# Patient Record
Sex: FEMALE | Race: WHITE | Employment: STUDENT | URBAN - METROPOLITAN AREA
[De-identification: names, ages, dates, MRNs, and addresses within clinical notes are randomized per-mention and may not be internally consistent; named-entity substitution may affect disease eponyms.]

---
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
## 2020-09-10 DIAGNOSIS — Z11.59 SPECIAL SCREENING EXAMINATION FOR VIRAL DISEASE: ICD-10-CM

## 2020-09-10 LAB
COVID-19 ANTIBODY IGG: NEGATIVE
LAB TEST METHOD: NORMAL

## 2020-09-11 LAB
SARS-COV-2 RNA SPEC QL NAA+PROBE: NOT DETECTED
SPECIMEN SOURCE: NORMAL

## 2020-09-12 DIAGNOSIS — Z11.59 SPECIAL SCREENING EXAMINATION FOR VIRAL DISEASE: ICD-10-CM

## 2020-09-12 LAB
SARS-COV-2 RNA SPEC QL NAA+PROBE: NOT DETECTED
SPECIMEN SOURCE: NORMAL

## 2021-01-03 ENCOUNTER — HEALTH MAINTENANCE LETTER (OUTPATIENT)
Age: 21
End: 2021-01-03

## 2021-03-31 ENCOUNTER — OFFICE VISIT (OUTPATIENT)
Dept: ORTHOPEDICS | Facility: CLINIC | Age: 21
End: 2021-03-31
Payer: COMMERCIAL

## 2021-03-31 VITALS
BODY MASS INDEX: 24.49 KG/M2 | HEIGHT: 65 IN | SYSTOLIC BLOOD PRESSURE: 117 MMHG | WEIGHT: 147 LBS | HEART RATE: 52 BPM | DIASTOLIC BLOOD PRESSURE: 67 MMHG

## 2021-03-31 DIAGNOSIS — M76.821 POSTERIOR TIBIAL TENDINITIS OF RIGHT LOWER EXTREMITY: Primary | ICD-10-CM

## 2021-03-31 RX ORDER — DICLOFENAC SODIUM 75 MG/1
75 TABLET, DELAYED RELEASE ORAL DAILY PRN
Qty: 30 TABLET | Refills: 0 | Status: SHIPPED | OUTPATIENT
Start: 2021-03-31 | End: 2022-01-22

## 2021-03-31 ASSESSMENT — MIFFLIN-ST. JEOR: SCORE: 1437.67

## 2021-03-31 NOTE — LETTER
Date:April 1, 2021      Patient was self referred, no letter generated. Do not send.        [facility] Health Information

## 2021-03-31 NOTE — LETTER
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  Abdelrahman first felt pain in her right foot just over a week ago, no single inciting event that she can recall.  She points to the medial aspect of her foot.  This is tender to the touch, no swelling, no numbness or tingling.  She denies hearing or feeling a pop.  She has been trying a rigid insole, Graston Technique, topical treatment, and oral Aleve.        Additional history: as documented    MEDICAL HISTORY  There is no problem list on file for this patient.      Current Outpatient Medications   Medication Sig Dispense Refill     diclofenac (VOLTAREN) 75 MG EC tablet Take 1 tablet (75 mg) by mouth daily as needed for moderate pain 30 tablet 0     norethindrone-ethinyl estradiol-iron (ESTROSTEP FE) 1-20/1-30/1-35 MG-MCG per tablet Take 1 tablet by mouth daily         Allergies   Allergen Reactions     Penicillin G        No family history on file.    Additional medical/Social/Surgical histories reviewed in [facility] and updated as appropriate.        PHYSICAL EXAM  Vitals:    03/31/21 0940   BP: 117/67   Pulse: 52   Weight: 66.7 kg (147 lb)   Height: 1.651 m (5' 5\")     General  - normal appearance, in no obvious distress  HEENT  - conjunctivae not injected, moist mucous membranes  CV  - normal pulses at posterior tib and dorsalis pedis  Pulm  - normal respiratory pattern, non-labored  Musculoskeletal - right foot  - stance: normal gait without limp  - inspection: mildly thickened PTT just inferior to navicular  - palpation: tender PTT at thickened area  - ROM: normal active and passive ROM of great and lesser toes, no pain with MT translation  - strength: 5/5 in all planes  Neuro  - no sensory or motor deficit, grossly normal coordination, normal muscle tone  Skin  - no ecchymosis, erythema, warmth, or induration, no " obvious rash  Psych  - interactive, appropriate, normal mood and affect         ASSESSMENT & PLAN  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    We discussed the treatment from a broad perspective, this does include offloading using a high, midfoot arch support, topical treatments, oral anti-inflammatories, rest, and rehab activities.  I am happy that she has initiated these.  I do think the most important thing for her at the moment is to reduce load on the tendon by continuing to wear her midfoot supports, I do encourage her to wear shoes at home as well, to prevent midfoot collapse and further stretching of the tendon.    I am prescribing her diclofenac for her pain and for inflammation.  She should not take ibuprofen or aleve with this medication.    If her pain is worsening over the coming weeks I would consider imaging.    It was a pleasure seeing Dasha today.    Myles Ventura DO, CAM  Primary Care Sports Medicine      This note was constructed using Dragon dictation software, please excuse any minor errors in spelling, grammar, or syntax.        Myles Ventura DO

## 2021-03-31 NOTE — PROGRESS NOTES
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  Abdelrahman first felt pain in her right foot just over a week ago, no single inciting event that she can recall.  She points to the medial aspect of her foot.  This is tender to the touch, no swelling, no numbness or tingling.  She denies hearing or feeling a pop.  She has been trying a rigid insole, Graston Technique, topical treatment, and oral Aleve.        Additional history: as documented    MEDICAL HISTORY  There is no problem list on file for this patient.      Current Outpatient Medications   Medication Sig Dispense Refill     diclofenac (VOLTAREN) 75 MG EC tablet Take 1 tablet (75 mg) by mouth daily as needed for moderate pain 30 tablet 0     norethindrone-ethinyl estradiol-iron (ESTROSTEP FE) 1-20/1-30/1-35 MG-MCG per tablet Take 1 tablet by mouth daily         Allergies   Allergen Reactions     Penicillin G        No family history on file.    Additional medical/Social/Surgical histories reviewed in [facility] and updated as appropriate.        PHYSICAL EXAM  Vitals:    03/31/21 0940   BP: 117/67   Pulse: 52   Weight: 66.7 kg (147 lb)   Height: 1.651 m (5' 5\")     General  - normal appearance, in no obvious distress  HEENT  - conjunctivae not injected, moist mucous membranes  CV  - normal pulses at posterior tib and dorsalis pedis  Pulm  - normal respiratory pattern, non-labored  Musculoskeletal - right foot  - stance: normal gait without limp  - inspection: mildly thickened PTT just inferior to navicular  - palpation: tender PTT at thickened area  - ROM: normal active and passive ROM of great and lesser toes, no pain with MT translation  - strength: 5/5 in all planes  Neuro  - no sensory or motor deficit, grossly normal coordination, normal muscle tone  Skin  - no ecchymosis, erythema, warmth, or induration, no obvious rash  Psych  - interactive, appropriate, normal mood and affect       "   ASSESSMENT & PLAN  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    We discussed the treatment from a broad perspective, this does include offloading using a high, midfoot arch support, topical treatments, oral anti-inflammatories, rest, and rehab activities.  I am happy that she has initiated these.  I do think the most important thing for her at the moment is to reduce load on the tendon by continuing to wear her midfoot supports, I do encourage her to wear shoes at home as well, to prevent midfoot collapse and further stretching of the tendon.    I am prescribing her diclofenac for her pain and for inflammation.  She should not take ibuprofen or aleve with this medication.    If her pain is worsening over the coming weeks I would consider imaging.    It was a pleasure seeing Dasha today.    Myles Ventura DO, [facility]  Primary Care Sports Medicine      This note was constructed using Dragon dictation software, please excuse any minor errors in spelling, grammar, or syntax.

## 2021-04-19 ENCOUNTER — ANCILLARY PROCEDURE (OUTPATIENT)
Dept: MRI IMAGING | Facility: CLINIC | Age: 21
End: 2021-04-19
Attending: FAMILY MEDICINE
Payer: COMMERCIAL

## 2021-04-19 ENCOUNTER — OFFICE VISIT (OUTPATIENT)
Dept: FAMILY MEDICINE | Facility: CLINIC | Age: 21
End: 2021-04-19
Payer: COMMERCIAL

## 2021-04-19 VITALS
HEIGHT: 65 IN | SYSTOLIC BLOOD PRESSURE: 126 MMHG | DIASTOLIC BLOOD PRESSURE: 75 MMHG | BODY MASS INDEX: 24.32 KG/M2 | HEART RATE: 50 BPM | WEIGHT: 146 LBS

## 2021-04-19 DIAGNOSIS — G89.29 CHRONIC PAIN OF RIGHT ANKLE: Primary | ICD-10-CM

## 2021-04-19 DIAGNOSIS — G89.29 CHRONIC PAIN OF RIGHT ANKLE: ICD-10-CM

## 2021-04-19 DIAGNOSIS — M25.571 CHRONIC PAIN OF RIGHT ANKLE: ICD-10-CM

## 2021-04-19 DIAGNOSIS — M25.571 CHRONIC PAIN OF RIGHT ANKLE: Primary | ICD-10-CM

## 2021-04-19 PROCEDURE — 73721 MRI JNT OF LWR EXTRE W/O DYE: CPT | Mod: RT | Performed by: RADIOLOGY

## 2021-04-19 ASSESSMENT — MIFFLIN-ST. JEOR: SCORE: 1433.13

## 2021-04-19 NOTE — LETTER
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  She states that she has had symptoms since early to mid-March.  She saw Dr. Ventura on 03/31 and arch supports were prescribed, which have helped decrease the pain, but it is still present.  She has been doing pool workouts and then trying to compete in meets.  Any kind of running flares her pain up.  She has been on Diclofenac 1 pill per day for at least 2 weeks.  She has also been in a walking boot some, but she does not like wearing that.  She has not had imaging.      CURRENT MEDICATIONS:     1. Diclofenac 75 mg 1 p.o. every day.     2. Oral contraceptive pills 1 per day.      ALLERGIES:  SHE IS ALLERGIC TO PENICILLIN.      PAST MEDICAL HISTORY:  Unchanged since her last visit.      OBJECTIVE:  Pleasant, in no apparent distress.  Vital signs as listed.  Her right ankle, she shows good range of motion.  She has some mild fullness in the medial aspect of the ankle and inferior and anterior to the medial malleolus.  She is completely nontender on the Achilles tendon over the lateral ankle or the anterior ankle.  She has some tenderness to palpation on the origin of the plantar fascia.  She has negative heel squeeze.  Remainder of her calcaneus is nontender.  The plantar fascia at the origin also feels somewhat tight compared to the opposite side.  She has some minor discomfort with resisted inversion localizing to the area of soreness.  She has more pain with resisted flexor hallucis longus strength testing:  No pain with resisted strength testing of the flexor digitorum.  She is nontender along her anterior tibialis muscle and tendon.  There is no tibiotalar joint line tenderness.  There is no effusion appreciated.  She is completely nontender in the remainder of her foot except  "for some minor tenderness at the insertion of the PT tendon on the navicular.  Her navicular is otherwise nontender.      ASSESSMENT:   1. Probable flexor hallucis longus tendonitis coupled with a posterior tibial tendonitis.   2. Plantar fasciitis.   3. Lower concern for other injuries, although talus stress reaction remains a possibility.      PLAN:  We have discussed some of the anatomy with Abdelrahman and the possibilities of the diagnosis.  I have recommended she undergo an MRI of her ankle with a special focus on this area.  She will get this today or tomorrow and then we will develop a treatment plan.  We briefly discussed using oral medication such as prednisone or also considering a tendon sheath injection with cortisone, although I would like to avoid that if possible.  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  Given that she would have to be out for approximately 4-5 days following the tendon sheath injection with steroid, we will try to sort this out around her competition schedule.  In early May they have 1 weekend when they will meet prior to [employer].  Polina Tinsley, ATC for  track and field, was present for the entire appointment.         /75   Pulse 50   Ht 1.651 m (5' 5\")   Wt 66.2 kg (146 lb)   BMI 24.30 kg/m          Faye Kaiser MD, CAQ, FACSM, CCD  [facility]  Sports Medicine and Bone Health  Team Physician;  Athletics        Faye Kiaser MD    "

## 2021-04-19 NOTE — PROGRESS NOTES
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  She states that she has had symptoms since early to mid-March.  She saw Dr. Ventura on 03/31 and arch supports were prescribed, which have helped decrease the pain, but it is still present.  She has been doing pool workouts and then trying to compete in meets.  Any kind of running flares her pain up.  She has been on Diclofenac 1 pill per day for at least 2 weeks.  She has also been in a walking boot some, but she does not like wearing that.  She has not had imaging.      CURRENT MEDICATIONS:     1. Diclofenac 75 mg 1 p.o. every day.     2. Oral contraceptive pills 1 per day.      ALLERGIES:  SHE IS ALLERGIC TO PENICILLIN.      PAST MEDICAL HISTORY:  Unchanged since her last visit.      OBJECTIVE:  Pleasant, in no apparent distress.  Vital signs as listed.  Her right ankle, she shows good range of motion.  She has some mild fullness in the medial aspect of the ankle and inferior and anterior to the medial malleolus.  She is completely nontender on the Achilles tendon over the lateral ankle or the anterior ankle.  She has some tenderness to palpation on the origin of the plantar fascia.  She has negative heel squeeze.  Remainder of her calcaneus is nontender.  The plantar fascia at the origin also feels somewhat tight compared to the opposite side.  She has some minor discomfort with resisted inversion localizing to the area of soreness.  She has more pain with resisted flexor hallucis longus strength testing:  No pain with resisted strength testing of the flexor digitorum.  She is nontender along her anterior tibialis muscle and tendon.  There is no tibiotalar joint line tenderness.  There is no effusion appreciated.  She is completely nontender in the remainder of her foot except for some minor tenderness at the insertion of the PT tendon on the navicular.  " Her navicular is otherwise nontender.      ASSESSMENT:   1. Probable flexor hallucis longus tendonitis coupled with a posterior tibial tendonitis.   2. Plantar fasciitis.   3. Lower concern for other injuries, although talus stress reaction remains a possibility.      PLAN:  We have discussed some of the anatomy with Abdelrahman and the possibilities of the diagnosis.  I have recommended she undergo an MRI of her ankle with a special focus on this area.  She will get this today or tomorrow and then we will develop a treatment plan.  We briefly discussed using oral medication such as prednisone or also considering a tendon sheath injection with cortisone, although I would like to avoid that if possible.  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  Given that she would have to be out for approximately 4-5 days following the tendon sheath injection with steroid, we will try to sort this out around her competition schedule.  In early May they have 1 weekend when they will meet prior to [employer].  Polina Tinsley, ATC for  track and field, was present for the entire appointment.         /75   Pulse 50   Ht 1.651 m (5' 5\")   Wt 66.2 kg (146 lb)   BMI 24.30 kg/m          Faye Kaiser MD, CAQ, FACSM, CCD  [facility]  Sports Medicine and Bone Health  Team Physician;  Athletics    "

## 2021-04-19 NOTE — LETTER
Date:April 26, 2021      Patient was self referred, no letter generated. Do not send.        [facility] Health Information

## 2021-04-20 ENCOUNTER — OFFICE VISIT (OUTPATIENT)
Dept: FAMILY MEDICINE | Facility: CLINIC | Age: 21
End: 2021-04-20
Payer: COMMERCIAL

## 2021-04-20 VITALS
DIASTOLIC BLOOD PRESSURE: 86 MMHG | HEIGHT: 65 IN | WEIGHT: 146 LBS | BODY MASS INDEX: 24.32 KG/M2 | SYSTOLIC BLOOD PRESSURE: 132 MMHG | HEART RATE: 75 BPM

## 2021-04-20 DIAGNOSIS — M76.821 POSTERIOR TIBIAL TENDINITIS OF RIGHT LEG: Primary | ICD-10-CM

## 2021-04-20 RX ORDER — TRIAMCINOLONE ACETONIDE 40 MG/ML
40 INJECTION, SUSPENSION INTRA-ARTICULAR; INTRAMUSCULAR ONCE
Status: ACTIVE | OUTPATIENT
Start: 2021-04-20

## 2021-04-20 RX ORDER — TRIAMCINOLONE ACETONIDE 40 MG/ML
40 INJECTION, SUSPENSION INTRA-ARTICULAR; INTRAMUSCULAR ONCE
Status: DISCONTINUED | OUTPATIENT
Start: 2021-04-20 | End: 2021-04-26

## 2021-04-20 ASSESSMENT — MIFFLIN-ST. JEOR: SCORE: 1433.13

## 2021-04-20 NOTE — LETTER
"  4/20/2021      RE: Abdelrahman Foreman  [address] [facility] [address]       [facility]            SUBJECTIVE:     Abdelrahman Foreman is a 20 year old female  presenting to clinic today for follow-up of her right posterior ankle pain.  She was just seen yesterday, 4/19/2021, by Dr. Kaiser.  MRI was ordered at that time.    PMH, Medications and Allergies were reviewed and updated as needed.    ROS:  As noted above otherwise negative.    There is no problem list on file for this patient.      Current Outpatient Medications   Medication Sig Dispense Refill     diclofenac (VOLTAREN) 75 MG EC tablet Take 1 tablet (75 mg) by mouth daily as needed for moderate pain 30 tablet 0     norethindrone-ethinyl estradiol-iron (ESTROSTEP FE) 1-20/1-30/1-35 MG-MCG per tablet Take 1 tablet by mouth daily                OBJECTIVE:     Vitals:   Vitals:    04/20/21 1150   BP: 132/86   Pulse: 75   Weight: 66.2 kg (146 lb)   Height: 1.651 m (5' 5\")     BMI: Body mass index is 24.3 kg/m .    Gen:  Well nourished and in no acute distress  HEENT: Extraocular movement intact.    Study Result    Exam: MRI of the right ankle dated 4/19/2021.     COMPARISON: None.     CLINICAL HISTORY: Sprinter with medial ankle and midfoot pain.     TECHNIQUE: Multiplanar, multisequence MR imaging of the right ankle  was obtained using standard sequences in 3 orthogonal planes without  the use of intravenous or intra-articular gadolinium contrast.     FINDINGS:     Minimal fluid at the right ankle tibiotalar joint.     No marrow signal abnormalities to suggest fracture, osteonecrosis, or  marrow infiltration.     The Achilles tendon is intact.      The plantar fascia is intact. Mild subcutaneous soft tissue edema  adjacent to the proximal plantar fascia. Mild soft tissue edema  adjacent to the central cord of the plantar fascia with mild  surrounding soft tissue edema.     Normal fat in the sinus tarsi.      No " osteochondral lesion.      Mild increased signal in the deep fibers of the deltoid ligamentous  complex. The superficial and tibial spring components are intact.      The anterior extensor tendons are intact. Thickening with  intrasubstance signal within the tibialis posterior tendon and mild  surrounding tenosynovitis, for example axial series 9 image 13. The  flexor hallucis longus and flexor digitorum longus tendons are intact.  The lateral peroneal tendons are intact.     The anterior and posterior syndesmotic ligaments, inferior transverse,  anterior and posterior talofibular, and calcaneofibular ligaments are  intact.                                                                      IMPRESSION:  1. Mild soft tissue edema adjacent to the central cord of the plantar  fascia with very mild surrounding soft tissue edema in the flexor  digitorum brevis muscle. Findings may represent mild plantar  fasciitis.   2. Tendinosis of the tibialis posterior tendon with associated  tenosynovitis. There is intrasubstance partial thickness tearing of  the tibialis posterior tendon, for example axial series 9 image 13.  3. No marrow signal abnormalities in the right ankle to suggest  fracture or marrow infiltration. No osteochondral lesion.              ASSESSMENT & PLAN:      Abdelrahman was seen today for foot pain.    Diagnoses and all orders for this visit:    Posterior tibial tendinitis of right leg  -     triamcinolone (KENALOG-40) injection 40 mg  -     INJECTION SINGLE TENDON SHEATH/LIGAMENT  -     triamcinolone (KENALOG-40) injection 40 mg    After benefits and risks were discussed, as well as options in terms of management for the patient's presentation.  The patient elected to proceed with a steroid injection of her posterior tibial tendon.    The patient was prepped in substerile fashion using ChloraPrep x45-second.  She was placed supine.  The posterior tibial tendon was visualized on ultrasound.  A 25-gauge needle  was attached to a 3 cc syringe.  A mixture of 1 cc of Kenalog and 1 cc of lidocaine was used to inject into the tendon sheath.  This was recorded on the [facility] portable ultrasound.  The patient tolerated the procedure well.  Nominal bleeding after.  Band-Aid applied.    For the patient's confounding plantar fasciitis as well as this posterior tibial tendinitis, we have suggested that she wear a cam boot during the day and a night splint while sleeping.  It is okay for her to remove the cam boot and the night splint while showering or when at home off of her feet.    Kenalog NDC: [address]-5  Kenalog LOT: [id number]  Kenalog EXP: 09/2022    Lidocaine NDC: 8684-0153-65  Lidocaine EXP: 8/1/2021    Options for treatment and/or follow-up care were reviewed with the patient and , Polina. Patient was actively involved in the decision making process. Patient verbalized understanding and was in agreement with the plan.    The patient was seen by and discussed with Dr.Suzanne YVETTE Kaiser MD, CAQ, CCD    Chas Ventura DO  Primary Care Sports Medicine Fellow      Attending Note:   I have personally examined this patient and have reviewed the clinical presentation and progress note with the fellow. I agree with the treatment plan as outlined. The plan was formulated with the fellow on the day of the patient's visit. I have reviewed all imaging with the fellow and agree with the findings in the documentation. I have supervised the MSK US guided procedure.     Faye Kaiser MD, CAQ, CCD  [facility]  Sports Medicine and Bone Health      Faye Kaiser MD

## 2021-04-20 NOTE — PROGRESS NOTES
"[facility]            SUBJECTIVE:     Abdelrahman Foreman is a 20 year old female  presenting to clinic today for follow-up of her right posterior ankle pain.  She was just seen yesterday, 4/19/2021, by Dr. Kaiser.  MRI was ordered at that time.    PMH, Medications and Allergies were reviewed and updated as needed.    ROS:  As noted above otherwise negative.    There is no problem list on file for this patient.      Current Outpatient Medications   Medication Sig Dispense Refill     diclofenac (VOLTAREN) 75 MG EC tablet Take 1 tablet (75 mg) by mouth daily as needed for moderate pain 30 tablet 0     norethindrone-ethinyl estradiol-iron (ESTROSTEP FE) 1-20/1-30/1-35 MG-MCG per tablet Take 1 tablet by mouth daily                OBJECTIVE:     Vitals:   Vitals:    04/20/21 1150   BP: 132/86   Pulse: 75   Weight: 66.2 kg (146 lb)   Height: 1.651 m (5' 5\")     BMI: Body mass index is 24.3 kg/m .    Gen:  Well nourished and in no acute distress  HEENT: Extraocular movement intact.    Study Result    Exam: MRI of the right ankle dated 4/19/2021.     COMPARISON: None.     CLINICAL HISTORY: Sprinter with medial ankle and midfoot pain.     TECHNIQUE: Multiplanar, multisequence MR imaging of the right ankle  was obtained using standard sequences in 3 orthogonal planes without  the use of intravenous or intra-articular gadolinium contrast.     FINDINGS:     Minimal fluid at the right ankle tibiotalar joint.     No marrow signal abnormalities to suggest fracture, osteonecrosis, or  marrow infiltration.     The Achilles tendon is intact.      The plantar fascia is intact. Mild subcutaneous soft tissue edema  adjacent to the proximal plantar fascia. Mild soft tissue edema  adjacent to the central cord of the plantar fascia with mild  surrounding soft tissue edema.     Normal fat in the sinus tarsi.      No osteochondral lesion.      Mild increased signal in the deep fibers of the deltoid " ligamentous  complex. The superficial and tibial spring components are intact.      The anterior extensor tendons are intact. Thickening with  intrasubstance signal within the tibialis posterior tendon and mild  surrounding tenosynovitis, for example axial series 9 image 13. The  flexor hallucis longus and flexor digitorum longus tendons are intact.  The lateral peroneal tendons are intact.     The anterior and posterior syndesmotic ligaments, inferior transverse,  anterior and posterior talofibular, and calcaneofibular ligaments are  intact.                                                                      IMPRESSION:  1. Mild soft tissue edema adjacent to the central cord of the plantar  fascia with very mild surrounding soft tissue edema in the flexor  digitorum brevis muscle. Findings may represent mild plantar  fasciitis.   2. Tendinosis of the tibialis posterior tendon with associated  tenosynovitis. There is intrasubstance partial thickness tearing of  the tibialis posterior tendon, for example axial series 9 image 13.  3. No marrow signal abnormalities in the right ankle to suggest  fracture or marrow infiltration. No osteochondral lesion.              ASSESSMENT & PLAN:      Abdelrahman was seen today for foot pain.    Diagnoses and all orders for this visit:    Posterior tibial tendinitis of right leg  -     triamcinolone (KENALOG-40) injection 40 mg  -     INJECTION SINGLE TENDON SHEATH/LIGAMENT  -     triamcinolone (KENALOG-40) injection 40 mg    After benefits and risks were discussed, as well as options in terms of management for the patient's presentation.  The patient elected to proceed with a steroid injection of her posterior tibial tendon.    The patient was prepped in substerile fashion using ChloraPrep x45-second.  She was placed supine.  The posterior tibial tendon was visualized on ultrasound.  A 25-gauge needle was attached to a 3 cc syringe.  A mixture of 1 cc of Kenalog and 1 cc of lidocaine  was used to inject into the tendon sheath.  This was recorded on the [employer] portable ultrasound.  The patient tolerated the procedure well.  Nominal bleeding after.  Band-Aid applied.    For the patient's confounding plantar fasciitis as well as this posterior tibial tendinitis, we have suggested that she wear a cam boot during the day and a night splint while sleeping.  It is okay for her to remove the cam boot and the night splint while showering or when at home off of her feet.    Kenalog NDC: [address]-0  Kenalog LOT: [id number]  Kenalog EXP: 09/2022    Lidocaine NDC: 9363-7456-70  Lidocaine EXP: 8/1/2021    Options for treatment and/or follow-up care were reviewed with the patient and , Polina. Patient was actively involved in the decision making process. Patient verbalized understanding and was in agreement with the plan.    The patient was seen by and discussed with Dr.Suzanne YVETTE Kaiser MD, CAQ, CCD    Chas Ventura DO  Primary Care Sports Medicine Fellow

## 2021-04-26 NOTE — PROGRESS NOTES
Attending Note:   I have personally examined this patient and have reviewed the clinical presentation and progress note with the fellow. I agree with the treatment plan as outlined. The plan was formulated with the fellow on the day of the patient's visit. I have reviewed all imaging with the fellow and agree with the findings in the documentation. I have supervised the MSK US guided procedure.     Faye Kaiser MD, CAQ, CCD  [facility]  Sports Medicine and Bone Health

## 2021-05-04 ENCOUNTER — OFFICE VISIT (OUTPATIENT)
Dept: FAMILY MEDICINE | Facility: CLINIC | Age: 21
End: 2021-05-04
Payer: COMMERCIAL

## 2021-05-04 VITALS
HEART RATE: 62 BPM | HEIGHT: 65 IN | WEIGHT: 142 LBS | BODY MASS INDEX: 23.66 KG/M2 | DIASTOLIC BLOOD PRESSURE: 80 MMHG | SYSTOLIC BLOOD PRESSURE: 121 MMHG

## 2021-05-04 DIAGNOSIS — M72.2 PLANTAR FASCIITIS OF RIGHT FOOT: ICD-10-CM

## 2021-05-04 DIAGNOSIS — M76.821 POSTERIOR TIBIAL TENDINITIS OF RIGHT LEG: Primary | ICD-10-CM

## 2021-05-04 RX ORDER — MELOXICAM 7.5 MG/1
TABLET ORAL
Qty: 30 TABLET | Refills: 0 | Status: SHIPPED | OUTPATIENT
Start: 2021-05-04 | End: 2022-01-22

## 2021-05-04 ASSESSMENT — MIFFLIN-ST. JEOR: SCORE: 1414.99

## 2021-05-04 NOTE — LETTER
Date:May 10, 2021      Patient was self referred, no letter generated. Do not send.        [facility] Health Information

## 2021-05-04 NOTE — PROGRESS NOTES
[facility]            SUBJECTIVE:     Abdelrahman Foreman is a 20 year old female  presenting to clinic today for a f/u of her right foot.    4/20/21: Underwent CSi U/S of the posterior tibial tendon and was instructed to continue wearing cam boot and night splint for confounding plantar fasciitis.     Today, Abdelrahman is feeling slightly better. She has been wearing the boot since her injection. Has attempted time outside of it minimally, to which she is ab;e to ambulate without pain. She is anticipating competing in the track meet, [employer] championships, next weekend. Pain medications sporadically. Does think something a bit stronger may help her as she begins to ramp up. Has also been wearing night splint for fasciitis.     PMH, Medications and Allergies were reviewed and updated as needed.    ROS:  As noted above otherwise negative.    There is no problem list on file for this patient.      Current Outpatient Medications   Medication Sig Dispense Refill     diclofenac (VOLTAREN) 75 MG EC tablet Take 1 tablet (75 mg) by mouth daily as needed for moderate pain 30 tablet 0     norethindrone-ethinyl estradiol-iron (ESTROSTEP FE) 1-20/1-30/1-35 MG-MCG per tablet Take 1 tablet by mouth daily                OBJECTIVE:     Vitals: There were no vitals filed for this visit.  BMI: There is no height or weight on file to calculate BMI.    Gen:  Well nourished and in no acute distress  HEENT: Extraocular movement intact.  Neck: supple  CV: RRR. No murmurs, rubs, or gallops  Resp: Lungs CTA. NO evidence of consolidation, wheezing, rales, or crackles.   Skin: No rash, erythema, ecchymosis or obvious abnormality  Psych: Euthymic   Neuro: Alert and oriented.    MSK: No brusing or muscle wasting of the ankle. No edema. No ttp over medial or lateral malleoli. Full ankle rom. Able to weight-bear without pain. Functional testing without pain or discomfort.             ASSESSMENT & PLAN:       Diagnoses and all orders for this visit:    Posterior tibial tendinitis of right leg      Abdelrahman is doing well at this time. Injection seemed to help tremendously for the PT tendonitis. She is hopefule for a return to track soon so she can compete next week.    I do not think her hopes are unreasonable. It is ok for her to remove usage of the daytime boot at this time. She is doing well and her testing today was reassuring. Would like her to gradually ramp up her activity, at the direction of the ATC to ensure her strength and mobility are good in preparation for her competing. I have also given her a prescription of mobic 7.5mg to be used once daily (1-2 ts), as needed, in the case she begins to have increasing discomfort after her workouts. I have asked that she continue to wear the night splint as she has confounding issues in her feet, and the plantar fasciitis will be based managed by doing this.       Options for treatment and/or follow-up care were reviewed with the patient and , Polina. Patient was actively involved in the decision making process. Patient verbalized understanding and was in agreement with the plan.      Chas Ventura, DO  Primary Care Sports Medicine Fellow

## 2021-05-04 NOTE — LETTER
5/4/2021      RE: Abdelrahman Foreman  [address] [facility] [address]       [facility]            SUBJECTIVE:     Abdelrahman Foreman is a 20 year old female  presenting to clinic today for a f/u of her right foot.    4/20/21: Underwent CSi U/S of the posterior tibial tendon and was instructed to continue wearing cam boot and night splint for confounding plantar fasciitis.     Today, Abdelrahman is feeling slightly better. She has been wearing the boot since her injection. Has attempted time outside of it minimally, to which she is ab;e to ambulate without pain. She is anticipating competing in the track meet, [employer] championships, next weekend. Pain medications sporadically. Does think something a bit stronger may help her as she begins to ramp up. Has also been wearing night splint for fasciitis.     PMH, Medications and Allergies were reviewed and updated as needed.    ROS:  As noted above otherwise negative.    There is no problem list on file for this patient.      Current Outpatient Medications   Medication Sig Dispense Refill     diclofenac (VOLTAREN) 75 MG EC tablet Take 1 tablet (75 mg) by mouth daily as needed for moderate pain 30 tablet 0     norethindrone-ethinyl estradiol-iron (ESTROSTEP FE) 1-20/1-30/1-35 MG-MCG per tablet Take 1 tablet by mouth daily                OBJECTIVE:     Vitals: There were no vitals filed for this visit.  BMI: There is no height or weight on file to calculate BMI.    Gen:  Well nourished and in no acute distress  HEENT: Extraocular movement intact.  Neck: supple  CV: RRR. No murmurs, rubs, or gallops  Resp: Lungs CTA. NO evidence of consolidation, wheezing, rales, or crackles.   Skin: No rash, erythema, ecchymosis or obvious abnormality  Psych: Euthymic   Neuro: Alert and oriented.    MSK: No brusing or muscle wasting of the ankle. No edema. No ttp over medial or lateral malleoli. Full ankle rom. Able to weight-bear without pain. Functional  testing without pain or discomfort.             ASSESSMENT & PLAN:      Diagnoses and all orders for this visit:    Posterior tibial tendinitis of right leg      Abdelrahman is doing well at this time. Injection seemed to help tremendously for the PT tendonitis. She is hopefule for a return to track soon so she can compete next week.    I do not think her hopes are unreasonable. It is ok for her to remove usage of the daytime boot at this time. She is doing well and her testing today was reassuring. Would like her to gradually ramp up her activity, at the direction of the ATC to ensure her strength and mobility are good in preparation for her competing. I have also given her a prescription of mobic 7.5mg to be used once daily (1-2 ts), as needed, in the case she begins to have increasing discomfort after her workouts. I have asked that she continue to wear the night splint as she has confounding issues in her feet, and the plantar fasciitis will be based managed by doing this.       Options for treatment and/or follow-up care were reviewed with the patient and , Polina. Patient was actively involved in the decision making process. Patient verbalized understanding and was in agreement with the plan.      Chas Ventura DO  Primary Care Sports Medicine Fellow      Attending Note:   I discussed this patient and have reviewed the clinical presentation and progress note with the fellow. I agree with the treatment plan as outlined. The plan was formulated with the fellow on the day of the patient's visit.   Faye Kaiser MD, CAQ, CCD  [facility]  Sports Medicine and Bone Health      Chas Ventura DO

## 2021-05-06 NOTE — PROGRESS NOTES
Attending Note:   I discussed this patient and have reviewed the clinical presentation and progress note with the fellow. I agree with the treatment plan as outlined. The plan was formulated with the fellow on the day of the patient's visit.   Faye Kaiser MD, CAQ, CCD  [facility]  Sports Medicine and Bone Health

## 2021-06-22 DIAGNOSIS — Z13.6 SCREENING FOR HEART DISEASE: Primary | ICD-10-CM

## 2021-07-30 DIAGNOSIS — Z13.6 SCREENING FOR HEART DISEASE: ICD-10-CM

## 2021-08-01 LAB
ATRIAL RATE - MUSE: 52 BPM
DIASTOLIC BLOOD PRESSURE - MUSE: NORMAL MMHG
INTERPRETATION ECG - MUSE: NORMAL
P AXIS - MUSE: 34 DEGREES
PR INTERVAL - MUSE: 160 MS
QRS DURATION - MUSE: 88 MS
QT - MUSE: 446 MS
QTC - MUSE: 414 MS
R AXIS - MUSE: 54 DEGREES
SYSTOLIC BLOOD PRESSURE - MUSE: NORMAL MMHG
T AXIS - MUSE: 42 DEGREES
VENTRICULAR RATE- MUSE: 52 BPM

## 2021-08-02 NOTE — PROGRESS NOTES
EKG clearance  Abdelrahman Foreman's EKG performed for clearance to participate in intercollegiate athletics at the [facility] has been reviewed on 08/01/21.  Findings are normal.      Additional follow up is not needed at this time.   Follow up tests: none    Abdelrahman Foreman is cleared to participate at this time.     Verona Oconnor MD

## 2021-10-05 ENCOUNTER — OFFICE VISIT (OUTPATIENT)
Dept: FAMILY MEDICINE | Facility: CLINIC | Age: 21
End: 2021-10-05
Payer: COMMERCIAL

## 2021-10-05 VITALS
WEIGHT: 153.4 LBS | DIASTOLIC BLOOD PRESSURE: 84 MMHG | BODY MASS INDEX: 25.56 KG/M2 | HEART RATE: 56 BPM | SYSTOLIC BLOOD PRESSURE: 125 MMHG | HEIGHT: 65 IN

## 2021-10-05 DIAGNOSIS — M25.571 CHRONIC PAIN OF RIGHT ANKLE: Primary | ICD-10-CM

## 2021-10-05 DIAGNOSIS — G89.29 CHRONIC PAIN OF RIGHT ANKLE: Primary | ICD-10-CM

## 2021-10-05 RX ORDER — DICLOFENAC SODIUM 75 MG/1
75 TABLET, DELAYED RELEASE ORAL 2 TIMES DAILY
Qty: 28 TABLET | Refills: 0 | Status: SHIPPED | OUTPATIENT
Start: 2021-10-05 | End: 2022-01-22

## 2021-10-05 ASSESSMENT — MIFFLIN-ST. JEOR: SCORE: 1461.7

## 2021-10-05 NOTE — LETTER
10/5/2021      RE: Abdelrahman Foreman  [address] [facility] [address]       [facility]            SUBJECTIVE:     Abdelrahman Foreman is a 21 year old female presenting to clinic today with a chief complaint of right ankle pain.     Abdelrahman has a history of right ankle pain and had an MRI on 4/19/21 which showed mild edema adjacent to the central cord of the plantar fascia and tendinosis of the tibialis posterior tendon with tenosynovitis and partial thickness intrasubstance tear. On 4/20/21 she had CSI into the posterior tibialis tendon sheath. She was instructed to wear a cam boot during the day and a night splint while sleeping. She was seen in follow up on 5/4/21 and had had significant improvement. She stopped wearing the boot after the injection and stopped wearing the night splint at the end of the season.     The whole summer it was fine. In July she started running 2-3 times per week and it was fine. It started to bother her again about three weeks ago. It was just bothering her a little bit. Last week on Monday it started to bother her significantly more. Last week she was late to class and she was speed walking and it was really painful. Hard for her to do single leg calf raise with just body weight. No numbness or tingling.     She hasn't taken anything medications for it this round.     She runs 100m and 200m.     PMH, Medications and Allergies were reviewed and updated as needed.    ROS:  As noted above otherwise negative.    There is no problem list on file for this patient.      Current Outpatient Medications   Medication Sig Dispense Refill     norethindrone-ethinyl estradiol-iron (ESTROSTEP FE) 1-20/1-30/1-35 MG-MCG per tablet Take 1 tablet by mouth daily       diclofenac (VOLTAREN) 75 MG EC tablet Take 1 tablet (75 mg) by mouth daily as needed for moderate pain (Patient not taking: Reported on 10/5/2021) 30 tablet 0     meloxicam (MOBIC) 7.5 MG tablet  "Take 1-2 tablets by mouth once daily with food. (Patient not taking: Reported on 10/5/2021) 30 tablet 0              OBJECTIVE:     Vitals:   Vitals:    10/05/21 1023   BP: 125/84   Pulse: 56   Weight: 69.6 kg (153 lb 6.4 oz)   Height: 1.651 m (5' 5\")     BMI: Body mass index is 25.53 kg/m .    Gen:  Well nourished and in no acute distress  HEENT: Extraocular movement intact.  Neck: supple  Skin: No rash, erythema, ecchymosis or obvious abnormality  Psych: Euthymic   Neuro: Alert and oriented.    MSK:   Musculoskeletal - right ankle  - stance: slight limp with walk, flat feet, a lot of difficulty with single leg calf raise  - inspection: no swelling or effusion, hypopigmentation of medial ankle, normal bone and joint alignment, no obvious deformity  - palpation: tender of posterior tibialis posterior to medial malleolus, malleoli and tarsal bones non-tender  - ROM: normal dorsiflexion, plantar flexion, inversion, eversion, not painful  - strength: 5/5 in all planes, mild pain with plantar flexion against resistance, moderate pain with inversion against resistance  - special tests:  (-) anterior drawer  (-) talar tilt  Neuro  - no sensory or motor deficit, grossly normal coordination, normal muscle tone  Skin  - no ecchymosis, erythema, warmth, or induration, no obvious rash. Hypopigmentation over medial ankle.  Psych  - interactive, appropriate, normal mood and affect    MRI Right Ankle (4/19/21):  FINDINGS:     Minimal fluid at the right ankle tibiotalar joint.     No marrow signal abnormalities to suggest fracture, osteonecrosis, or  marrow infiltration.     The Achilles tendon is intact.      The plantar fascia is intact. Mild subcutaneous soft tissue edema  adjacent to the proximal plantar fascia. Mild soft tissue edema  adjacent to the central cord of the plantar fascia with mild  surrounding soft tissue edema.     Normal fat in the sinus tarsi.      No osteochondral lesion.      Mild increased signal in the " deep fibers of the deltoid ligamentous  complex. The superficial and tibial spring components are intact.      The anterior extensor tendons are intact. Thickening with  intrasubstance signal within the tibialis posterior tendon and mild  surrounding tenosynovitis, for example axial series 9 image 13. The  flexor hallucis longus and flexor digitorum longus tendons are intact.  The lateral peroneal tendons are intact.     The anterior and posterior syndesmotic ligaments, inferior transverse,  anterior and posterior talofibular, and calcaneofibular ligaments are  intact.                                                                      IMPRESSION:  1. Mild soft tissue edema adjacent to the central cord of the plantar  fascia with very mild surrounding soft tissue edema in the flexor  digitorum brevis muscle. Findings may represent mild plantar  fasciitis.   2. Tendinosis of the tibialis posterior tendon with associated  tenosynovitis. There is intrasubstance partial thickness tearing of  the tibialis posterior tendon, for example axial series 9 image 13.  3. No marrow signal abnormalities in the right ankle to suggest  fracture or marrow infiltration. No osteochondral lesion.         ASSESSMENT & PLAN:      Abdelrahman was seen today for musculoskeletal problem.    Diagnoses and all orders for this visit:    Chronic pain of right ankle: Chronic right pain of ankle with MRI 6 months ago showing tibialis posterior tendinosis with partial thickness intrasubstance tear and resolution of symptoms after tendon sheath CSI. Since increasing her running she has had reaggrevation of the same pain and now will sometimes have pain with just walking around campus. She has not done any specific therapy working on strengthening her ankle and posterior tibialis.   - CAM boot while walking around campus  - avoid aggravating activities  - diclofenac 75mg BID for 2 weeks  - rehab focused on strengthening posterior tibialis  - consider PRP  injection if not improved in 2 weeks or by the end of the month if slow return to sport after 2 week period of rest    Options for treatment and/or follow-up care were reviewed with the patient and , Mary Levine. Patient was actively involved in the decision making process. Patient verbalized understanding and was in agreement with the plan.    The patient was discussed with Dr.Suzanne YVETTE Kaiser MD, CAPAULINO, CCD    Katelyn Herrera MD  Primary Care Sports Medicine Fellow      Attending Note:   I have discussed this patient and have reviewed the clinical presentation and progress note with the fellow. I agree with the treatment plan as outlined. The plan was formulated with the fellow on the day of the patient's visit.     Faye Kaiser MD, CAQ, CCD  [facility]  Sports Medicine and Bone Health      Katelyn Herrera MD

## 2021-10-05 NOTE — PROGRESS NOTES
[facility]            SUBJECTIVE:     Abdelrahman Foreman is a 21 year old female presenting to clinic today with a chief complaint of right ankle pain.     Abdelrahman has a history of right ankle pain and had an MRI on 4/19/21 which showed mild edema adjacent to the central cord of the plantar fascia and tendinosis of the tibialis posterior tendon with tenosynovitis and partial thickness intrasubstance tear. On 4/20/21 she had CSI into the posterior tibialis tendon sheath. She was instructed to wear a cam boot during the day and a night splint while sleeping. She was seen in follow up on 5/4/21 and had had significant improvement. She stopped wearing the boot after the injection and stopped wearing the night splint at the end of the season.     The whole summer it was fine. In July she started running 2-3 times per week and it was fine. It started to bother her again about three weeks ago. It was just bothering her a little bit. Last week on Monday it started to bother her significantly more. Last week she was late to class and she was speed walking and it was really painful. Hard for her to do single leg calf raise with just body weight. No numbness or tingling.     She hasn't taken anything medications for it this round.     She runs 100m and 200m.     PMH, Medications and Allergies were reviewed and updated as needed.    ROS:  As noted above otherwise negative.    There is no problem list on file for this patient.      Current Outpatient Medications   Medication Sig Dispense Refill     norethindrone-ethinyl estradiol-iron (ESTROSTEP FE) 1-20/1-30/1-35 MG-MCG per tablet Take 1 tablet by mouth daily       diclofenac (VOLTAREN) 75 MG EC tablet Take 1 tablet (75 mg) by mouth daily as needed for moderate pain (Patient not taking: Reported on 10/5/2021) 30 tablet 0     meloxicam (MOBIC) 7.5 MG tablet Take 1-2 tablets by mouth once daily with food. (Patient not taking: Reported on  "10/5/2021) 30 tablet 0              OBJECTIVE:     Vitals:   Vitals:    10/05/21 1023   BP: 125/84   Pulse: 56   Weight: 69.6 kg (153 lb 6.4 oz)   Height: 1.651 m (5' 5\")     BMI: Body mass index is 25.53 kg/m .    Gen:  Well nourished and in no acute distress  HEENT: Extraocular movement intact.  Neck: supple  Skin: No rash, erythema, ecchymosis or obvious abnormality  Psych: Euthymic   Neuro: Alert and oriented.    MSK:   Musculoskeletal - right ankle  - stance: slight limp with walk, flat feet, a lot of difficulty with single leg calf raise  - inspection: no swelling or effusion, hypopigmentation of medial ankle, normal bone and joint alignment, no obvious deformity  - palpation: tender of posterior tibialis posterior to medial malleolus, malleoli and tarsal bones non-tender  - ROM: normal dorsiflexion, plantar flexion, inversion, eversion, not painful  - strength: 5/5 in all planes, mild pain with plantar flexion against resistance, moderate pain with inversion against resistance  - special tests:  (-) anterior drawer  (-) talar tilt  Neuro  - no sensory or motor deficit, grossly normal coordination, normal muscle tone  Skin  - no ecchymosis, erythema, warmth, or induration, no obvious rash. Hypopigmentation over medial ankle.  Psych  - interactive, appropriate, normal mood and affect    MRI Right Ankle (4/19/21):  FINDINGS:     Minimal fluid at the right ankle tibiotalar joint.     No marrow signal abnormalities to suggest fracture, osteonecrosis, or  marrow infiltration.     The Achilles tendon is intact.      The plantar fascia is intact. Mild subcutaneous soft tissue edema  adjacent to the proximal plantar fascia. Mild soft tissue edema  adjacent to the central cord of the plantar fascia with mild  surrounding soft tissue edema.     Normal fat in the sinus tarsi.      No osteochondral lesion.      Mild increased signal in the deep fibers of the deltoid ligamentous  complex. The superficial and tibial spring " components are intact.      The anterior extensor tendons are intact. Thickening with  intrasubstance signal within the tibialis posterior tendon and mild  surrounding tenosynovitis, for example axial series 9 image 13. The  flexor hallucis longus and flexor digitorum longus tendons are intact.  The lateral peroneal tendons are intact.     The anterior and posterior syndesmotic ligaments, inferior transverse,  anterior and posterior talofibular, and calcaneofibular ligaments are  intact.                                                                      IMPRESSION:  1. Mild soft tissue edema adjacent to the central cord of the plantar  fascia with very mild surrounding soft tissue edema in the flexor  digitorum brevis muscle. Findings may represent mild plantar  fasciitis.   2. Tendinosis of the tibialis posterior tendon with associated  tenosynovitis. There is intrasubstance partial thickness tearing of  the tibialis posterior tendon, for example axial series 9 image 13.  3. No marrow signal abnormalities in the right ankle to suggest  fracture or marrow infiltration. No osteochondral lesion.         ASSESSMENT & PLAN:      Abdelrahman was seen today for musculoskeletal problem.    Diagnoses and all orders for this visit:    Chronic pain of right ankle: Chronic right pain of ankle with MRI 6 months ago showing tibialis posterior tendinosis with partial thickness intrasubstance tear and resolution of symptoms after tendon sheath CSI. Since increasing her running she has had reaggrevation of the same pain and now will sometimes have pain with just walking around campus. She has not done any specific therapy working on strengthening her ankle and posterior tibialis.   - CAM boot while walking around campus  - avoid aggravating activities  - diclofenac 75mg BID for 2 weeks  - rehab focused on strengthening posterior tibialis  - consider PRP injection if not improved in 2 weeks or by the end of the month if slow return to  sport after 2 week period of rest    Options for treatment and/or follow-up care were reviewed with the patient and , Mary Levine. Patient was actively involved in the decision making process. Patient verbalized understanding and was in agreement with the plan.    The patient was discussed with Dr.Suzanne YVETTE Kaiser MD, CAQ, CCD    Katelyn Herrera MD  Primary Care Sports Medicine Fellow

## 2021-10-10 ENCOUNTER — HEALTH MAINTENANCE LETTER (OUTPATIENT)
Age: 21
End: 2021-10-10

## 2021-10-11 NOTE — PROGRESS NOTES
Attending Note:   I have discussed this patient and have reviewed the clinical presentation and progress note with the fellow. I agree with the treatment plan as outlined. The plan was formulated with the fellow on the day of the patient's visit.     Faye Kaiser MD, CAQ, CCD  [facility]  Sports Medicine and Bone Health

## 2021-10-13 DIAGNOSIS — R53.83 FATIGUE, UNSPECIFIED TYPE: Primary | ICD-10-CM

## 2021-10-14 ENCOUNTER — LAB (OUTPATIENT)
Dept: LAB | Facility: CLINIC | Age: 21
End: 2021-10-14
Payer: COMMERCIAL

## 2021-10-14 DIAGNOSIS — R53.83 FATIGUE, UNSPECIFIED TYPE: ICD-10-CM

## 2021-10-14 LAB
FERRITIN SERPL-MCNC: 47 NG/ML (ref 12–150)
HGB BLD-MCNC: 14.4 G/DL (ref 11.7–15.7)

## 2021-10-14 PROCEDURE — 85018 HEMOGLOBIN: CPT

## 2021-10-14 PROCEDURE — 82728 ASSAY OF FERRITIN: CPT

## 2021-10-19 ENCOUNTER — ANCILLARY PROCEDURE (OUTPATIENT)
Dept: MRI IMAGING | Facility: CLINIC | Age: 21
End: 2021-10-19
Attending: FAMILY MEDICINE
Payer: COMMERCIAL

## 2021-10-19 ENCOUNTER — OFFICE VISIT (OUTPATIENT)
Dept: FAMILY MEDICINE | Facility: CLINIC | Age: 21
End: 2021-10-19
Payer: COMMERCIAL

## 2021-10-19 VITALS
WEIGHT: 154 LBS | DIASTOLIC BLOOD PRESSURE: 91 MMHG | HEIGHT: 65 IN | BODY MASS INDEX: 25.66 KG/M2 | HEART RATE: 67 BPM | SYSTOLIC BLOOD PRESSURE: 131 MMHG

## 2021-10-19 DIAGNOSIS — M76.821 POSTERIOR TIBIAL TENDINITIS OF RIGHT LOWER EXTREMITY: Primary | ICD-10-CM

## 2021-10-19 DIAGNOSIS — M76.821 POSTERIOR TIBIAL TENDINITIS OF RIGHT LOWER EXTREMITY: ICD-10-CM

## 2021-10-19 PROCEDURE — 73721 MRI JNT OF LWR EXTRE W/O DYE: CPT | Mod: RT | Performed by: RADIOLOGY

## 2021-10-19 ASSESSMENT — MIFFLIN-ST. JEOR: SCORE: 1464.42

## 2021-10-19 NOTE — PROGRESS NOTES
Attending Note:   I have  examined this patient/images and have reviewed the clinical presentation and progress note with the fellow. I agree with the treatment plan as outlined. The plan was formulated with the fellow on the day of the patient's visit.     Faye Kaiser MD, CAQ, CCD  [facility]  Sports Medicine and Bone Health

## 2021-10-19 NOTE — PROGRESS NOTES
"[facility]            SUBJECTIVE:     Abdelrahman Foreman is a 21 year old female presenting to clinic today for follow up of right ankle pain.     Abdelrahman has had persistent right ankle pain. Last springe she had an MRI which showed d tendinosis of the tibialis posterior tendon with tenosynovitis and partial thickness intrasubstance tear. On 4/20/21 she had CSI into the posterior tibialis tendon sheath. She improved and did not have trouble over the summer, but for the past 4 weeks has had pain.     She was last seen on 10/5/21. She was started on a two week course of diclofenac and instructed to wear CAM boot. She improved some but after trying to do a few things with her ankle it hurt her again and she doesn't feel like she has made much progress and that it is essentially the same.     PMH, Medications and Allergies were reviewed and updated as needed.    ROS:  As noted above otherwise negative.    There is no problem list on file for this patient.      Current Outpatient Medications   Medication Sig Dispense Refill     norethindrone-ethinyl estradiol-iron (ESTROSTEP FE) 1-20/1-30/1-35 MG-MCG per tablet Take 1 tablet by mouth daily       diclofenac (VOLTAREN) 75 MG EC tablet Take 1 tablet (75 mg) by mouth 2 times daily (Patient not taking: Reported on 10/19/2021) 28 tablet 0     diclofenac (VOLTAREN) 75 MG EC tablet Take 1 tablet (75 mg) by mouth daily as needed for moderate pain (Patient not taking: Reported on 10/5/2021) 30 tablet 0     meloxicam (MOBIC) 7.5 MG tablet Take 1-2 tablets by mouth once daily with food. (Patient not taking: Reported on 10/5/2021) 30 tablet 0              OBJECTIVE:     Vitals:   Vitals:    10/19/21 1042   BP: (!) 131/91   Pulse: 67   Weight: 69.9 kg (154 lb)   Height: 1.651 m (5' 5\")     BMI: Body mass index is 25.63 kg/m .    Gen:  Well nourished and in no acute distress  HEENT: Extraocular movement intact.  Neck: supple  Skin: No rash, " erythema, ecchymosis or obvious abnormality  Psych: Euthymic   Neuro: Alert and oriented.    MSK:   Musculoskeletal - right ankle  - stance: sl flat feet, difficulty with single leg calf raise  - inspection: no swelling or effusion, hypopigmentation of medial ankle, normal bone and joint alignment, no obvious deformity  - palpation: tender of posterior tibialis posterior to medial malleolus, malleoli and tarsal bones non-tender  - ROM: normal dorsiflexion, plantar flexion, inversion, eversion, not painful  - strength: 5/5 in all planes, mild pain with plantar flexion against resistance, moderate pain with eversion against resistance  - special tests:  (-) anterior drawer  (-) talar tilt  Neuro  - no sensory or motor deficit, grossly normal coordination, normal muscle tone  Skin  - no ecchymosis, erythema, warmth, or induration, no obvious rash. Hypopigmentation over medial ankle.  Psych  - interactive, appropriate, normal mood and affect    Ultrasound: Posterior tibialis tendon with areas of disorganization and hyperechoic areas concerning for defect/tearing within the tendon.        ASSESSMENT & PLAN:      Abdelrahman was seen today for musculoskeletal problem.    Diagnoses and all orders for this visit:    Chronic pain of right ankle: Chronic right pain of ankle with MRI 6 months ago showing tibialis posterior tendinosis with partial thickness intrasubstance tear and resolution of symptoms after tendon sheath CSI but with return of symptoms since returning to run this fall. Minimal improvement with oral diclofenac, activity modification and CAM boot. Ultrasound with findings concerning for posterior tibialis tearing and edema.  - MRI ankle  - plan for PRP to posterior tibialis  - continue rehab and activity as tolerated    Options for treatment and/or follow-up care were reviewed with the patient and Foreign. Patient was actively involved in the decision making process. Patient verbalized understanding  and was in agreement with the plan.    The patient was discussed with Dr.Suzanne YVETTE Kaiser MD, CAQ, CCD    Katelyn Herrera MD  Primary Care Sports Medicine Fellow

## 2021-10-19 NOTE — LETTER
"  10/19/2021      RE: Abdelrahman Foreman  [address] [facility] [address]       [facility]            SUBJECTIVE:     Abdelrahman Foreman is a 21 year old female presenting to clinic today for follow up of right ankle pain.     Abdelrahman has had persistent right ankle pain. Last springe she had an MRI which showed d tendinosis of the tibialis posterior tendon with tenosynovitis and partial thickness intrasubstance tear. On 4/20/21 she had CSI into the posterior tibialis tendon sheath. She improved and did not have trouble over the summer, but for the past 4 weeks has had pain.     She was last seen on 10/5/21. She was started on a two week course of diclofenac and instructed to wear CAM boot. She improved some but after trying to do a few things with her ankle it hurt her again and she doesn't feel like she has made much progress and that it is essentially the same.     PMH, Medications and Allergies were reviewed and updated as needed.    ROS:  As noted above otherwise negative.    There is no problem list on file for this patient.      Current Outpatient Medications   Medication Sig Dispense Refill     norethindrone-ethinyl estradiol-iron (ESTROSTEP FE) 1-20/1-30/1-35 MG-MCG per tablet Take 1 tablet by mouth daily       diclofenac (VOLTAREN) 75 MG EC tablet Take 1 tablet (75 mg) by mouth 2 times daily (Patient not taking: Reported on 10/19/2021) 28 tablet 0     diclofenac (VOLTAREN) 75 MG EC tablet Take 1 tablet (75 mg) by mouth daily as needed for moderate pain (Patient not taking: Reported on 10/5/2021) 30 tablet 0     meloxicam (MOBIC) 7.5 MG tablet Take 1-2 tablets by mouth once daily with food. (Patient not taking: Reported on 10/5/2021) 30 tablet 0              OBJECTIVE:     Vitals:   Vitals:    10/19/21 1042   BP: (!) 131/91   Pulse: 67   Weight: 69.9 kg (154 lb)   Height: 1.651 m (5' 5\")     BMI: Body mass index is 25.63 kg/m .    Gen:  Well nourished and in no acute " distress  HEENT: Extraocular movement intact.  Neck: supple  Skin: No rash, erythema, ecchymosis or obvious abnormality  Psych: Euthymic   Neuro: Alert and oriented.    MSK:   Musculoskeletal - right ankle  - stance: sl flat feet, difficulty with single leg calf raise  - inspection: no swelling or effusion, hypopigmentation of medial ankle, normal bone and joint alignment, no obvious deformity  - palpation: tender of posterior tibialis posterior to medial malleolus, malleoli and tarsal bones non-tender  - ROM: normal dorsiflexion, plantar flexion, inversion, eversion, not painful  - strength: 5/5 in all planes, mild pain with plantar flexion against resistance, moderate pain with eversion against resistance  - special tests:  (-) anterior drawer  (-) talar tilt  Neuro  - no sensory or motor deficit, grossly normal coordination, normal muscle tone  Skin  - no ecchymosis, erythema, warmth, or induration, no obvious rash. Hypopigmentation over medial ankle.  Psych  - interactive, appropriate, normal mood and affect    Ultrasound: Posterior tibialis tendon with areas of disorganization and hyperechoic areas concerning for defect/tearing within the tendon.        ASSESSMENT & PLAN:      Abdelrahman was seen today for musculoskeletal problem.    Diagnoses and all orders for this visit:    Chronic pain of right ankle: Chronic right pain of ankle with MRI 6 months ago showing tibialis posterior tendinosis with partial thickness intrasubstance tear and resolution of symptoms after tendon sheath CSI but with return of symptoms since returning to run this fall. Minimal improvement with oral diclofenac, activity modification and CAM boot. Ultrasound with findings concerning for posterior tibialis tearing and edema.  - MRI ankle  - plan for PRP to posterior tibialis  - continue rehab and activity as tolerated    Options for treatment and/or follow-up care were reviewed with the patient and Foreign. Patient was actively  involved in the decision making process. Patient verbalized understanding and was in agreement with the plan.    The patient was discussed with Dr.Suzanne YVETTE Kaiser MD, CAQ, CCD    Katelyn Herrera MD  Primary Care Sports Medicine Fellow    Attending Note:   I have  examined this patient/images and have reviewed the clinical presentation and progress note with the fellow. I agree with the treatment plan as outlined. The plan was formulated with the fellow on the day of the patient's visit.     Faye Kaiser MD, CAPAULINO, CCD  [facility]  Sports Medicine and Bone Health    Attending Note:   I have  examined this patient /images and have reviewed the clinical presentation and progress note with the fellow. I agree with the treatment plan as outlined. The plan was formulated with the fellow on the day of the patient's visit.   Faye Kaiser MD, CAPAULINO, CCD

## 2021-10-22 NOTE — PROGRESS NOTES
Attending Note:   I have  examined this patient /images and have reviewed the clinical presentation and progress note with the fellow. I agree with the treatment plan as outlined. The plan was formulated with the fellow on the day of the patient's visit.     Faye Kaiser MD, CAQ, CCD  [facility]  Sports Medicine and Bone Health

## 2021-10-23 NOTE — PROGRESS NOTES
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 Worsening tibialis posterior pathology.  *  Increasing tenosynovial fluid about the tibialis posterior  (tenosynovitis).  *  Worsening tendinosis and mild worsening of intrasubstance tearing  of the distal tibialis posterior.     2. Mild plantar fasciitis, similar to prior.    Last injection: Posterior Tibialis CSI 4/20/21      Right posterior tibialis platelet rich plasma injection with MSK ultrasound guidance  After risks, benefits and complications  were discussed with the patient they elected to proceed.  Written informed consent was obtained. 27 cc of blood was drawn from the antecubital area by myself into a syringe containing 3 ml of anticoagulant. It was transferred into the [employer] and spun to separate leukocyte rich platelet rich plasma. 3 cc of PRP was retrieved.     Using sterile technique, the area was first prepped with chloraprep. Ultrasound was used to evaluate the symptomatic area which was found to have hyperechoic areas with irregularity within the tendon. Lidocaine 1% was used to anesthetize the area. Under MSK ultrasound guidance, a 25 gauge 1 1/2 inch needle was used to inject 3cc of PRP into the posterior tibialis in two areas that were symptomatic.      Pt. initally tolerated the procedure well however started to feel faint and nauseated and briefly vasovagaled. Her legs were elevated and she regained consciousness. Cool cloth was put on her forehead and she was slowly sat up and given juice and crackers. She felt better and stable and was assisted to the vehicle with her  [facility] KATHERINE Carrasquillo who will continue to monitor her.    I have instructed the patient that this procedure should increase the pain temporarily and then improve the pain over a few weeks.  It can be repeated if benefits are noted but not as much as anticipated.  Avoid NSAIDS.  Ok to use tylenol and ice for pain control following the injection.    The entire procedure was supervised Dr.Suzanne YVETTE Kaiser MD, CAQ, CCD    Katelyn Herrera MD  Primary Care Sports Medicine Fellow      Medium Joint Injection/PRP: R ankle    Date/Time: 10/28/2021 4:00 PM  Performed by: Katelyn Herrera MD  Authorized by: Faye Kaiser MD     Indications:  Pain  Needle Size:  25 G  Approach:  Lateral  Location:  Ankle  Location comment:  Posterior tibial tendon  Site:  R ankle  Medications:  3 mL lidocaine (PF) 1 %  Outcome:  Tolerated well, no immediate complications  Procedure discussed: discussed risks, benefits, and alternatives    Consent Given by:  Patient  Timeout: timeout called immediately prior to procedure    Prep: patient was prepped and draped in usual sterile fashion

## 2021-10-28 ENCOUNTER — OFFICE VISIT (OUTPATIENT)
Dept: ORTHOPEDICS | Facility: CLINIC | Age: 21
End: 2021-10-28
Payer: COMMERCIAL

## 2021-10-28 VITALS — WEIGHT: 154 LBS | BODY MASS INDEX: 25.66 KG/M2 | HEIGHT: 65 IN

## 2021-10-28 DIAGNOSIS — M76.821 POSTERIOR TIBIAL TENDINITIS OF RIGHT LOWER EXTREMITY: Primary | ICD-10-CM

## 2021-10-28 PROCEDURE — 0232T NJX PLATELET PLASMA: CPT | Mod: RT | Performed by: FAMILY MEDICINE

## 2021-10-28 RX ADMIN — LIDOCAINE HYDROCHLORIDE 3 ML: 10 INJECTION, SOLUTION EPIDURAL; INFILTRATION; INTRACAUDAL; PERINEURAL at 16:00

## 2021-10-28 ASSESSMENT — MIFFLIN-ST. JEOR: SCORE: 1464.42

## 2021-10-28 NOTE — LETTER
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 Worsening tibialis posterior pathology.  *  Increasing tenosynovial fluid about the tibialis posterior  (tenosynovitis).  *  Worsening tendinosis and mild worsening of intrasubstance tearing  of the distal tibialis posterior.     2. Mild plantar fasciitis, similar to prior.    Last injection: Posterior Tibialis CSI 4/20/21      Right posterior tibialis platelet rich plasma injection with MSK ultrasound guidance  After risks, benefits and complications  were discussed with the patient they elected to proceed.  Written informed consent was obtained. 27 cc of blood was drawn from the antecubital area by myself into a syringe containing 3 ml of anticoagulant. It was transferred into the [employer] and spun to separate leukocyte rich platelet rich plasma. 3 cc of PRP was retrieved.     Using sterile technique, the area was first prepped with chloraprep. Ultrasound was used to evaluate the symptomatic area which was found to have hyperechoic areas with irregularity within the tendon. Lidocaine 1% was used to anesthetize the area. Under MSK ultrasound guidance, a 25 gauge 1 1/2 inch needle was used to inject 3cc of PRP into the posterior tibialis in two areas that were symptomatic.      Pt. initally tolerated the procedure well however started to feel faint and nauseated and briefly vasovagaled. Her legs were elevated and she regained consciousness. Cool cloth was put on her forehead and she was slowly sat up and given juice and  crackers. She felt better and stable and was assisted to the vehicle with her [facility] ATC Luke who will continue to monitor her.    I have instructed the patient that this procedure should increase the pain temporarily and then improve the pain over a few weeks.  It can be repeated if benefits are noted but not as much as anticipated.  Avoid NSAIDS.  Ok to use tylenol and ice for pain control following the injection.    The entire procedure was supervised Dr.Suzanne YVETTE Kaiser MD, CAPAULINO, CCD    Katelyn Herrera MD  Primary Care Sports Medicine Fellow      Medium Joint Injection/PRP: R ankle    Date/Time: 10/28/2021 4:00 PM  Performed by: Katelyn Herrera MD  Authorized by: Faye Kaiser MD     Indications:  Pain  Needle Size:  25 G  Approach:  Lateral  Location:  Ankle  Location comment:  Posterior tibial tendon  Site:  R ankle  Medications:  3 mL lidocaine (PF) 1 %  Outcome:  Tolerated well, no immediate complications  Procedure discussed: discussed risks, benefits, and alternatives    Consent Given by:  Patient  Timeout: timeout called immediately prior to procedure    Prep: patient was prepped and draped in usual sterile fashion            Attending Note:   I have  examined this patient/images and have reviewed the clinical presentation and progress note with the fellow. I agree with the treatment plan as outlined. The plan was formulated with the fellow on the day of the patient's visit. I have directly supervised the PRP injection.      Faye Kaiser MD, CAQ, CCD  [facility]  Sports Medicine and Bone Health      Katelyn Herrera MD

## 2021-10-28 NOTE — NURSING NOTE
[address] [address]  Dept: [phone]  ______________________________________________________________________________    Patient: Abdelrahman Foreman   : 2000   MRN: [id number]   2021    INVASIVE PROCEDURE SAFETY CHECKLIST    Date: 10/28/21   Procedure: Right ankle PRP  Patient Name: Abdelrahman Foreman  MRN: [id number]  YOB: 2000    Action: Complete sections as appropriate. Any discrepancy results in a HARD COPY until resolved.     PRE PROCEDURE:  Patient ID verified with 2 identifiers (name and  or MRN): Yes  Procedure and site verified with patient/designee (when able): Yes  Accurate consent documentation in medical record: Yes  H&P (or appropriate assessment) documented in medical record: Yes  H&P must be up to 20 days prior to procedure and updates within 24 hours of procedure as applicable: NA  Relevant diagnostic and radiology test results appropriately labeled and displayed as applicable: Yes  Procedure site(s) marked with provider initials: NA    TIMEOUT:  Time-Out performed immediately prior to starting procedure, including verbal and active participation of all team members addressing the following:Yes  * Correct patient identify  * Confirmed that the correct side and site are marked  * An accurate procedure consent form  * Agreement on the procedure to be done  * Correct patient position  * Relevant images and results are properly labeled and appropriately displayed  * The need to administer antibiotics or fluids for irrigation purposes during the procedure as applicable   * Safety precautions based on patient history or medication use    DURING PROCEDURE: Verification of correct person, site, and procedures any time the responsibility for care of the patient is transferred to another member of the care team.       Prior to injection, verified patient identity using patient's name and date of birth.  Due to  injection administration, patient instructed to remain in clinic for 15 minutes  afterwards, and to report any adverse reaction to me immediately.    Tendon sheath injection was performed.     Drug Amount Wasted:  Yes: 2 mg/ml   Vial/Syringe: Single dose vial  Expiration Date:  07/2024      Justa Rosado, ATC  October 28, 2021

## 2021-10-29 RX ORDER — LIDOCAINE HYDROCHLORIDE 10 MG/ML
3 INJECTION, SOLUTION EPIDURAL; INFILTRATION; INTRACAUDAL; PERINEURAL
Status: SHIPPED | OUTPATIENT
Start: 2021-10-28

## 2021-10-29 NOTE — PROGRESS NOTES
Attending Note:   I have  examined this patient/images and have reviewed the clinical presentation and progress note with the fellow. I agree with the treatment plan as outlined. The plan was formulated with the fellow on the day of the patient's visit. I have directly supervised the PRP injection.      Faye Kaiser MD, CAQ, CCD  [facility]  Sports Medicine and Bone Health

## 2021-11-08 DIAGNOSIS — M76.829 POSTERIOR TIBIAL TENDON DYSFUNCTION: Primary | ICD-10-CM

## 2022-01-03 DIAGNOSIS — R53.83 FATIGUE, UNSPECIFIED TYPE: Primary | ICD-10-CM

## 2022-01-04 ENCOUNTER — DOCUMENTATION ONLY (OUTPATIENT)
Dept: ORTHOPEDICS | Facility: CLINIC | Age: 22
End: 2022-01-04
Payer: COMMERCIAL

## 2022-01-04 NOTE — PROGRESS NOTES
Pt reports symptoms recurrent with sprinting and fast running today. We will try taping tomorrow to see if that helps.

## 2022-01-04 NOTE — PROGRESS NOTES
Musculoskeletal Problem          ROS      Physical Exam      Shoulder Musculoskeletal Exam  Elbow Musculoskeletal Exam  Hand/Wrist Musculoskeletal Exam  Thoracolumbar Musculoskeletal Exam  Cervical Spine Musculoskeletal Exam  Hip Musculoskeletal Exam  Knee Musculoskeletal Exam  Foot/Ankle Musculoskeletal Exam  Comprehensive Orthopaedic Musculoskeletal Exam

## 2022-01-06 ENCOUNTER — LAB (OUTPATIENT)
Dept: LAB | Facility: CLINIC | Age: 22
End: 2022-01-06
Payer: COMMERCIAL

## 2022-01-06 DIAGNOSIS — R53.83 FATIGUE, UNSPECIFIED TYPE: ICD-10-CM

## 2022-01-06 LAB
FERRITIN SERPL-MCNC: 64 NG/ML (ref 12–150)
HGB BLD-MCNC: 13.7 G/DL (ref 11.7–15.7)

## 2022-01-06 PROCEDURE — 85018 HEMOGLOBIN: CPT

## 2022-01-06 PROCEDURE — 82728 ASSAY OF FERRITIN: CPT

## 2022-01-11 ENCOUNTER — OFFICE VISIT (OUTPATIENT)
Dept: FAMILY MEDICINE | Facility: CLINIC | Age: 22
End: 2022-01-11
Payer: COMMERCIAL

## 2022-01-11 VITALS
SYSTOLIC BLOOD PRESSURE: 119 MMHG | WEIGHT: 150 LBS | HEIGHT: 65 IN | DIASTOLIC BLOOD PRESSURE: 79 MMHG | HEART RATE: 68 BPM | BODY MASS INDEX: 24.99 KG/M2

## 2022-01-11 DIAGNOSIS — M76.821 POSTERIOR TIBIAL TENDINITIS OF RIGHT LOWER EXTREMITY: Primary | ICD-10-CM

## 2022-01-11 RX ORDER — DEXAMETHASONE SODIUM PHOSPHATE 4 MG/ML
4 INJECTION, SOLUTION INTRA-ARTICULAR; INTRALESIONAL; INTRAMUSCULAR; INTRAVENOUS; SOFT TISSUE EVERY 24 HOURS
Status: CANCELLED | OUTPATIENT
Start: 2022-01-11

## 2022-01-11 RX ORDER — DEXAMETHASONE SODIUM PHOSPHATE 4 MG/ML
INJECTION, SOLUTION INTRA-ARTICULAR; INTRALESIONAL; INTRAMUSCULAR; INTRAVENOUS; SOFT TISSUE
Qty: 10 ML | Refills: 0 | Status: SHIPPED | OUTPATIENT
Start: 2022-01-11 | End: 2022-01-25

## 2022-01-11 ASSESSMENT — MIFFLIN-ST. JEOR: SCORE: 1446.28

## 2022-01-11 NOTE — PROGRESS NOTES
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     She had PRP at the end of October in her right PT tendon. She was off her of foot for a week or two. She then did the boost, working her way up and did well with that. She had a little discomfort but overall felt well. Around Thanksgiving she got back to running on the turf, starting with simple striding. She progresed to on the track and it hurt her to go faster with her stride and with running. It isn't as bad with jogging but sprinting or anything that she has to toe off with hurts.    Taping has helped with striding and skipping, but doesn't make a difference with sprinting.     She also got orthotics which she wears in her sneakers and her spikes.     She isn't at the level where she can compete right now. They are hoping she will be ready for the spring season. She is a senior and so that will be her last time competing.     She is unsure how long the CSI lasted previously because she only needed it to work for about 2 weeks before [employer] and then she didn't train much after over the summer.     PMH, Medications and Allergies were reviewed and updated as needed.    ROS:  As noted above otherwise negative.    Patient Active Problem List   Diagnosis     Posterior tibial tendinitis of right lower extremity       Current Outpatient Medications   Medication Sig Dispense Refill     diclofenac (VOLTAREN) 75 MG EC tablet Take 1 tablet (75 mg) by mouth 2 times daily (Patient not taking: Reported on 10/19/2021) 28 tablet 0     diclofenac (VOLTAREN) 75 MG EC tablet Take 1 tablet (75 mg) by mouth daily as needed for moderate pain (Patient not taking: Reported on 10/5/2021) 30 tablet 0     meloxicam (MOBIC) 7.5 MG tablet Take 1-2 tablets by mouth once daily with food. (Patient not taking: Reported on  "10/5/2021) 30 tablet 0     norethindrone-ethinyl estradiol-iron (ESTROSTEP FE) 1-20/1-30/1-35 MG-MCG per tablet Take 1 tablet by mouth daily                OBJECTIVE:     Vitals:   Vitals:    01/11/22 1141   BP: 119/79   Pulse: 68   Weight: 68 kg (150 lb)   Height: 1.651 m (5' 5\")     BMI: Body mass index is 24.96 kg/m .    Gen:  Well nourished and in no acute distress  HEENT: Extraocular movement intact.  Neck: supple  Skin: No rash, erythema, ecchymosis or obvious abnormality  Psych: Euthymic   Neuro: Alert and oriented.    MSK: Right ankle with full range of motion. No obvious swelling or deformity. Tender to palpation over PT tendon around posterior medial malleolus. 5/5 strength with pain with dorsiflexion against resistance and inversion against resistance. Pain with single leg calf raise.             ASSESSMENT & PLAN:      Abdelrahman was seen today for musculoskeletal problem.    Diagnoses and all orders for this visit:    Posterior tibial tendinitis of right lower extremity: S/p PRP with some initial benefit now with recurrence of symptoms and unable to sprint because of it. Hoping to be ready to compete in the spring season. Had significant improvement with CSI previously but unsure how long it lasted.   - iontotherapy with dexamethasone, plan for five consecutive day course  - determine optimal timing for CSI injection if needed, with anticipation that it should last around 6 weeks  - follow up ~1 week after ionto round      Options for treatment and/or follow-up care were reviewed with the patient and , [employer] ATC. Patient was actively involved in the decision making process. Patient verbalized understanding and was in agreement with the plan.    The patient was discussed with Dr.Suzanne YVETTE Kaiser MD, CAQ, CCD    Katelyn Herrera MD  Primary Care Sports Medicine Fellow    "

## 2022-01-11 NOTE — LETTER
1/11/2022      RE: Abdelrahman Foreman  [address] [facility] [address]       [facility] Athletic Medicine Clinic            SUBJECTIVE:     Abdelrahman Foreman is a 21 year old female [employer] presenting to clinic today with a chief complaint of right ankle pain.     She had PRP at the end of October in her right PT tendon. She was off her of foot for a week or two. She then did the boost, working her way up and did well with that. She had a little discomfort but overall felt well. Around Thanksgiving she got back to running on the turf, starting with simple striding. She progresed to on the track and it hurt her to go faster with her stride and with running. It isn't as bad with jogging but sprinting or anything that she has to toe off with hurts.    Taping has helped with striding and skipping, but doesn't make a difference with sprinting.     She also got orthotics which she wears in her sneakers and her spikes.     She isn't at the level where she can compete right now. They are hoping she will be ready for the spring season. She is a senior and so that will be her last time competing.     She is unsure how long the CSI lasted previously because she only needed it to work for about 2 weeks before [employer] and then she didn't train much after over the summer.     PMH, Medications and Allergies were reviewed and updated as needed.    ROS:  As noted above otherwise negative.    Patient Active Problem List   Diagnosis     Posterior tibial tendinitis of right lower extremity       Current Outpatient Medications   Medication Sig Dispense Refill     diclofenac (VOLTAREN) 75 MG EC tablet Take 1 tablet (75 mg) by mouth 2 times daily (Patient not taking: Reported on 10/19/2021) 28 tablet 0     diclofenac (VOLTAREN) 75 MG EC tablet Take 1 tablet (75 mg) by mouth daily as needed for moderate pain (Patient not taking: Reported on 10/5/2021) 30 tablet 0     meloxicam (MOBIC) 7.5 MG tablet Take 1-2  "tablets by mouth once daily with food. (Patient not taking: Reported on 10/5/2021) 30 tablet 0     norethindrone-ethinyl estradiol-iron (ESTROSTEP FE) 1-20/1-30/1-35 MG-MCG per tablet Take 1 tablet by mouth daily                OBJECTIVE:     Vitals:   Vitals:    01/11/22 1141   BP: 119/79   Pulse: 68   Weight: 68 kg (150 lb)   Height: 1.651 m (5' 5\")     BMI: Body mass index is 24.96 kg/m .    Gen:  Well nourished and in no acute distress  HEENT: Extraocular movement intact.  Neck: supple  Skin: No rash, erythema, ecchymosis or obvious abnormality  Psych: Euthymic   Neuro: Alert and oriented.    MSK: Right ankle with full range of motion. No obvious swelling or deformity. Tender to palpation over PT tendon around posterior medial malleolus. 5/5 strength with pain with dorsiflexion against resistance and inversion against resistance. Pain with single leg calf raise.             ASSESSMENT & PLAN:      Abdelrahman was seen today for musculoskeletal problem.    Diagnoses and all orders for this visit:    Posterior tibial tendinitis of right lower extremity: S/p PRP with some initial benefit now with recurrence of symptoms and unable to sprint because of it. Hoping to be ready to compete in the spring season. Had significant improvement with CSI previously but unsure how long it lasted.   - iontotherapy with dexamethasone, plan for five consecutive day course  - determine optimal timing for CSI injection if needed, with anticipation that it should last around 6 weeks  - follow up ~1 week after ionto round      Options for treatment and/or follow-up care were reviewed with the patient and , [employer] AT[employer]. Patient was actively involved in the decision making process. Patient verbalized understanding and was in agreement with the plan.    The patient was discussed with Dr.Suzanne YVETTE Kaiser MD, CAQ, CCD    Katelyn Herrera MD  Primary Care Sports Medicine Fellow      Attending Note:   I have discussed this " patient and have reviewed the clinical presentation and progress note with the fellow. I agree with the treatment plan as outlined. The plan was formulated with the fellow on the day of the patient's visit.     Faye Kaiser MD, CAQ, CCD  [facility]  Sports Medicine and Bone Health      Katelyn Herrera MD

## 2022-01-11 NOTE — LETTER
Date:January 13, 2022      Patient was self referred, no letter generated. Do not send.        [facility] Health Information

## 2022-01-12 NOTE — PROGRESS NOTES
Attending Note:   I have discussed this patient and have reviewed the clinical presentation and progress note with the fellow. I agree with the treatment plan as outlined. The plan was formulated with the fellow on the day of the patient's visit.     Faye Kaiser MD, CAQ, CCD  [facility]  Sports Medicine and Bone Health

## 2022-01-13 ENCOUNTER — THERAPY VISIT (OUTPATIENT)
Dept: PHYSICAL THERAPY | Facility: CLINIC | Age: 22
End: 2022-01-13
Payer: COMMERCIAL

## 2022-01-13 DIAGNOSIS — M67.979 TIBIALIS POSTERIOR TENDINOPATHY: ICD-10-CM

## 2022-01-13 PROCEDURE — 97161 PT EVAL LOW COMPLEX 20 MIN: CPT | Mod: GP | Performed by: PHYSICAL THERAPIST

## 2022-01-13 PROCEDURE — 20560 NDL INSJ W/O NJX 1 OR 2 MUSC: CPT | Performed by: PHYSICAL THERAPIST

## 2022-01-13 NOTE — PROGRESS NOTES
Physical Therapy Initial Evaluation  Subjective:  HPI                  Physical Therapy Initial Examination/Evaluation  January 13, 2022    Abdelrahman Foreman is a 21 year old female referred to physical therapy by Dr. Kaiser for treatment of right post tib tendinopathy with Precautions/Restrictions/MD instructions none  Back to training 50%.  Sprinter 100, 200.    PRP helped a little bit-end of October.  Reaggravated with sprinting in spikes 1/3/22.   Pt is a senior.    Walking on toes and pushing off hurts.    HEP: toe/heel walking, doming, band exercises    Subjective:  DOI/onset: 4/1/2020   Acute Injury or Gradual Onset?: Gradual injury over time  Mechanism of Injury: Sport; track-sprinter  Related PMH: hx of broke toe on opposite foot, some hx of plantar fasciitis on the right otherwise no knee or hip injuries   Chief Complaint/Functional Limitations:  Pain with push off motion, sprinting in cleets and see below in therapy evaluation codes   Pain: rest 0 /10, activity 5/10 Location: right post tib tendon Frequency: Intermittent Described as: sharp Alleviated by: Rest and Ice Progression of Symptoms: up and down Time of day when pain is worse: Activity related and Position related  Sleeping: No issues/uninterrupted   Occupation: student    Current HEP/exercise regimen: with   Patient's goals are see chief complaints return to running, goal focus on outdoor    Other pertinent PMH/Red Flags: None   Barriers at home/work: None as reported by patient  Pertinent Surgical History: none  Medications: None as reported by patient  General health as reported by patient: good  Return to MD:  Not at this time      Objective:  System  Obs: otherwise healthy female  Fair longitudinal arch  TTP: right post tib tendon, right post tib muscle belly proximal  MMT: post tib right 5/5 and pain  ankle ROM right inv 45 and mild pain, left 45 right ev 13, left ev 14   pain with heel raise right able to do 20  Pain with SL squat and  good LE control   Gait: fluid    Physical Exam    General     ROS    Assessment/Plan:    Patient is a 21 year old female with right side ankle complaints.    Patient has the following significant findings with corresponding treatment plan.                Diagnosis 1:  Right post tib tendinopathy  Pain -  hot/cold therapy, manual therapy and home program  Decreased function - therapeutic activities    Therapy Evaluation Codes:   1) History comprised of:   Personal factors that impact the plan of care:      None.    Comorbidity factors that impact the plan of care are:      None.     Medications impacting care: None.  2) Examination of Body Systems comprised of:   Body structures and functions that impact the plan of care:      Ankle.   Activity limitations that impact the plan of care are:      Sports.  3) Clinical presentation characteristics are:   Stable/Uncomplicated.  4) Decision-Making    Low complexity using standardized patient assessment instrument and/or measureable assessment of functional outcome.  Cumulative Therapy Evaluation is: Low complexity.    Previous and current functional limitations:  (See Goal Flow Sheet for this information)    Short term and Long term goals: (See Goal Flow Sheet for this information)     Communication ability:  Patient appears to be able to clearly communicate and understand verbal and written communication and follow directions correctly.  Treatment Explanation - The following has been discussed with the patient:   RX ordered/plan of care  Anticipated outcomes  Possible risks and side effects  This patient would benefit from PT intervention to resume normal activities.   Rehab potential is excellent.    Frequency:  1 X week, once daily  Duration:  for 8 weeks  Discharge Plan:  Achieve all LTG.  Independent in home treatment program.  Reach maximal therapeutic benefit.    Please refer to the daily flowsheet for treatment today, total treatment time and time spent performing  1:1 timed codes.     The risks, perceived benefits and potential complications (including but not limited to: dizziness/drowsiness, bleeding, infection, pain, damage to adjacent structures, failure to relieve symptoms) of dry needling were discussed with the patient. Questions were addressed and answered.  The patient elected to proceed. Verbal informed consent was obtained.

## 2022-01-20 ENCOUNTER — DOCUMENTATION ONLY (OUTPATIENT)
Dept: FAMILY MEDICINE | Facility: CLINIC | Age: 22
End: 2022-01-20
Payer: COMMERCIAL

## 2022-01-20 NOTE — PROGRESS NOTES
[facility] ATHLETICS    Physical Therapy initial assessment note  Treating therapist: Vensesa Tidwell  Date of service performed: January 20, 2022  Sport: [employer]    Concern: Chronic injury  Body part: Foot  Description: Right  Injury: Posterior Tibial Injury  Date of injury: chronic     S: Has been dealing with chronic right foot pain - posterior tibial tendon - since March 2020. Has had some relief with cortisone injections and PRP but continues to have pain with increased intensity of exercise. Has been doing rehab consistently without significant improvement. Recently started BFR.    O: equivalent range of motion,  -4cm WB PF R compared to L, 4/5 inversion strength with pain, 4/5 plantar flexion with pain    A: Continues to be impacted with chronic right foot/posterior tibial pain. Encouraged Abdelrahman to return to CAM boot for pain free ambulation, discussed regressing to pain free activities and exercise only.     P: boot, alter g, BFR, follow up 1/25      Leanna Tidwell, PT

## 2022-01-22 ENCOUNTER — OFFICE VISIT (OUTPATIENT)
Dept: ORTHOPEDICS | Facility: CLINIC | Age: 22
End: 2022-01-22
Payer: COMMERCIAL

## 2022-01-22 VITALS — WEIGHT: 150 LBS | HEIGHT: 65 IN | BODY MASS INDEX: 24.99 KG/M2

## 2022-01-22 DIAGNOSIS — M76.821 POSTERIOR TIBIAL TENDINITIS OF RIGHT LOWER EXTREMITY: Primary | ICD-10-CM

## 2022-01-22 PROCEDURE — 99213 OFFICE O/P EST LOW 20 MIN: CPT | Mod: GC | Performed by: FAMILY MEDICINE

## 2022-01-22 RX ORDER — KETOROLAC TROMETHAMINE 10 MG/1
10 TABLET, FILM COATED ORAL 4 TIMES DAILY PRN
Qty: 20 TABLET | Refills: 0 | Status: SHIPPED | OUTPATIENT
Start: 2022-01-22 | End: 2022-01-27

## 2022-01-22 ASSESSMENT — MIFFLIN-ST. JEOR: SCORE: 1446.28

## 2022-01-22 NOTE — LETTER
Date:January 31, 2022      Patient was self referred, no letter generated. Do not send.        [facility] Health Information

## 2022-01-22 NOTE — LETTER
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    Abdelrahman states that she has been experiencing posterior-medial ankle pain dating back to the Fall of 2020. She has a tried a variety of therapy protocols, oral anti-inflammatories, a PRP injection, and a CSI of the posterior tibial tendon sheath for treatment. Of these, the steroid injection was the most successful and therapy has kept her symptoms at bay. She states today her pain is a 3/10 and that she does not really notice the pain with light jogging but will after hard sprinting work outs. She is more focused on the Spring outdoor season versus the Winter indoor season and is hoping to develop a therapy plan to align with this.    PMH, Medications and Allergies were reviewed and updated as needed.    ROS:  As noted above otherwise negative.    Patient Active Problem List   Diagnosis     Posterior tibial tendinitis of right lower extremity     Tibialis posterior tendinopathy       Current Outpatient Medications   Medication Sig Dispense Refill     ketorolac (TORADOL) 10 MG tablet Take 1 tablet (10 mg) by mouth 4 times daily as needed for moderate pain 20 tablet 0     norethindrone-ethinyl estradiol-iron (ESTROSTEP FE) 1-20/1-30/1-35 MG-MCG per tablet Take 1 tablet by mouth daily       dexamethasone (DECADRON) 4 MG/ML injection Use 4 mg or dose determined by provider for iontophoresis. 5 mL 0            OBJECTIVE:       Vitals:   Vitals:    01/22/22 1111   Weight: 68 kg (150 lb)   Height: 1.651 m (5' 5\")     BMI: Body mass index is 24.96 kg/m .  Vital Signs: Ht 1.651 m (5' 5\")   Wt 68 kg (150 lb)   BMI 24.96 kg/m   Patient declined being weighed. Body mass index is 24.96 kg/m .    General  - " normal appearance, in no obvious distress  Musculoskeletal - ankle  - stance: normal gait without limp  - inspection: no swelling or effusion,  normal bone and joint alignment, no obvious deformity  - palpation: no soft tissue tenderness, malleoli and tarsal bones non-tender  - ROM: normal dorsiflexion, plantar flexion, inversion, eversion, not painful  - strength: 5/5 in all planes  - special tests:  (-) anterior drawer  (-) talar tilt  (-) Tinel's  Neuro  - no sensory or motor deficit, grossly normal coordination, normal muscle tone  Skin  - no ecchymosis, erythema, warmth, or induration, no obvious rash    Ultrasound Evaluation : thickening of the posterior tibial tendon with a scant effusion. Mild degree of hyperemia with doppler suggesting some degree of active inflammation. No tenderness with sonopalpation.           ASSESSMENT and PLAN:     Abdelrahman was seen today for recheck.    Diagnoses and all orders for this visit:    Posterior tibial tendinitis of right lower extremity: Treatment options discussed at length which included rest, continued therapy, oral anti-inflammatories, CSI, and repeat PRP. Given mild symptoms at this time and desire to be full strength for the Spring season the patient opted for prn oral anti-inflammatories at this time. We did discuss the option of a tendon sheath injection ~3 weeks before the start of the outdoor season and the patient will think about this and contact us if pain necessitates this intervention. Plan for follow up prn.   -     ketorolac (TORADOL) 10 MG tablet; Take 1 tablet (10 mg) by mouth 4 times daily as needed for moderate pain      Options for treatment and/or follow-up care were reviewed with the patient was actively involved in the decision making process. Patient verbalized understanding and was in agreement with the plan.    The patient was seen by and discussed with Dr.Suzanne YVETTE Kaiser MD, CAQ, CCD    Ryan Dash DO PGY-4  [facility] Sports  Medicine Fellow 2021-22      Attending Note:   I have personally examined this patient and have reviewed the clinical presentation and progress note with the fellow. I agree with the treatment plan as outlined. The plan was formulated with the fellow on the day of the patient's visit. I have reviewed all imaging with the fellow and agree with the findings in the documentation.     The plan was communicated to Foreign Stringer ATC by myself via a telephone call     Faye Kaiser MD, CAQ, CCD  [facility]  Sports Medicine and Bone Health      Faye Kaiser MD

## 2022-01-25 ENCOUNTER — DOCUMENTATION ONLY (OUTPATIENT)
Dept: FAMILY MEDICINE | Facility: CLINIC | Age: 22
End: 2022-01-25
Payer: COMMERCIAL

## 2022-01-25 DIAGNOSIS — M76.821 POSTERIOR TIBIAL TENDINITIS OF RIGHT LOWER EXTREMITY: ICD-10-CM

## 2022-01-25 RX ORDER — DEXAMETHASONE SODIUM PHOSPHATE 4 MG/ML
INJECTION, SOLUTION INTRA-ARTICULAR; INTRALESIONAL; INTRAMUSCULAR; INTRAVENOUS; SOFT TISSUE
Qty: 5 ML | Refills: 0 | Status: SHIPPED | OUTPATIENT
Start: 2022-01-25

## 2022-01-26 ENCOUNTER — VIRTUAL VISIT (OUTPATIENT)
Dept: ORTHOPEDICS | Facility: CLINIC | Age: 22
End: 2022-01-26
Payer: COMMERCIAL

## 2022-01-26 DIAGNOSIS — M76.821 POSTERIOR TIBIAL TENDINITIS OF RIGHT LOWER EXTREMITY: Primary | ICD-10-CM

## 2022-01-26 PROCEDURE — 99213 OFFICE O/P EST LOW 20 MIN: CPT | Mod: 95 | Performed by: FAMILY MEDICINE

## 2022-01-26 NOTE — LETTER
1/26/2022      RE: Abdelrahman Foreman  [address] [facility] [address]           Abdelrahman is a 21 year old who is being evaluated via a billable telephone visit.      What phone number would you like to be contacted at? [phone]  How would you like to obtain your AVS? Demar  Phone call duration: 11 minutes    S:  Telephone call with Dasha and Foreign Stringer to update Dasha on the treatment plan developed with myself, Foreign and  Michele Aguilar earlier today.     O:  Sounds appropriate.      A:  Chronic PT tendinopathy      P:    Jeff shared his concern that Dasha hasn't been able to train much this year due to her tendinitis problem and really needs an 8 week cycle of training in order to make the rely team that will compete at outdoor [employer] in May.  Given this concern and that Dasha is done with track following this outdoor season we will follow this treatment plan;  1. Rest and rehab and cross training until Feb 6  -Started iontophoresis round 2 yesterday and will do this for a 5 day cycle.  2. Feb 7:  Resume sprint training.  Trial of oral Toradol 10mg; one pill with breakfast and one with lunch prior to afternoon training on Feb 7th.  Dasha will let Foreign know how she responds to that medication.  We may have her repeat it on Feb 10 for harder sprint training  3. CSI tendon sheath injection on Feb 11 morning.   4. Resume training on Feb 14 afternoon  5.  Monitor her symptoms and train with some modifications as per  Jeff (ie not back to back hard sprinting days, include cross training non impact  6.  Aim for her to compete on in the April 1st meet.     Dasha was in agreement with this plan and is hopeful that she will be able to train more effectively.      Foreign Stringer ATC was on the entire telephone appt.    Faye Kaiser MD, CAQ, FACSM, CCD  [facility]  Sports Medicine and Bone Health  Team Physician;  Athletics           Faye Kaiser MD

## 2022-01-26 NOTE — LETTER
1/26/2022       RE: Abdelrahman Foreman  [address] [facility] [address]     Dear Colleague,    Thank you for referring your patient, Abdelrahman Foreman, to the [facility] SPORTS MEDICINE CLINIC [place] at [facility]. Please see a copy of my visit note below.        Abdelrahman is a 21 year old who is being evaluated via a billable telephone visit.      What phone number would you like to be contacted at? [phone]  How would you like to obtain your AVS? Leviharjovani  Phone call duration: 11 minutes    S:  Telephone call with Dasha and Foreign Stringer to update Dasha on the treatment plan developed with myself, Foreign and  Michele Aguilar earlier today.     O:  Sounds appropriate.      A:  Chronic PT tendinopathy      P:    Jeff shared his concern that Dasha hasn't been able to train much this year due to her tendinitis problem and really needs an 8 week cycle of training in order to make the rely team that will compete at outdoor [employer] in May.  Given this concern and that Dasha is done with track following this outdoor season we will follow this treatment plan;  1. Rest and rehab and cross training until Feb 6  -Started iontophoresis round 2 yesterday and will do this for a 5 day cycle.  2. Feb 7:  Resume sprint training.  Trial of oral Toradol 10mg; one pill with breakfast and one with lunch prior to afternoon training on Feb 7th.  Dasha will let Foreign know how she responds to that medication.  We may have her repeat it on Feb 10 for harder sprint training  3. CSI tendon sheath injection on Feb 11 morning.   4. Resume training on Feb 14 afternoon  5.  Monitor her symptoms and train with some modifications as per redd Aguilar (ie not back to back hard sprinting days, include cross training non impact  6.  Aim for her to compete on in the April 1st meet.     Dasha was in agreement with this plan and is hopeful that she will be able to train more effectively.       Foreign Stringer ATC was on the entire telephone appt.    Faye Kaiser MD, CAQ, FACSM, CCD  [facility]  Sports Medicine and Bone Health  Team Physician;  Athletics           Again, thank you for allowing me to participate in the care of your patient.      Sincerely,    Faye Kaiser MD

## 2022-01-26 NOTE — PROGRESS NOTES
Pt is currently completing a second round of Iontophoresis in attempts to minimize symptoms. There have been some discrepancies between pt, Dr. Novak, and  Jeff on what exactly goals are for pt. Pt has received prescription Toradol to hopefully decrease pn and push through training to be ready by [employer] standard for outdoor season. This was given by Dr. Novak with the expectation that pt would compete indoor. There is a phone call scheduled today between doc and  to go over expectations and I will communicate to pt following.     Pt worked with PT resident Heather in [facility] where we did running analysis on [employer] and saw pt excessively pronated foot on heel strike causing no pain at 50% body weight. When cued to activate medial ankle musculature and increase gayle at same [facility] pt's pronation diminished and no pain was reported. Pt also received dry needling following session from Heather.

## 2022-01-26 NOTE — PROGRESS NOTES
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    Abdelrahman states that she has been experiencing posterior-medial ankle pain dating back to the Fall of 2020. She has a tried a variety of therapy protocols, oral anti-inflammatories, a PRP injection, and a CSI of the posterior tibial tendon sheath for treatment. Of these, the steroid injection was the most successful and therapy has kept her symptoms at bay. She states today her pain is a 3/10 and that she does not really notice the pain with light jogging but will after hard sprinting work outs. She is more focused on the Spring outdoor season versus the Winter indoor season and is hoping to develop a therapy plan to align with this.    PMH, Medications and Allergies were reviewed and updated as needed.    ROS:  As noted above otherwise negative.    Patient Active Problem List   Diagnosis     Posterior tibial tendinitis of right lower extremity     Tibialis posterior tendinopathy       Current Outpatient Medications   Medication Sig Dispense Refill     ketorolac (TORADOL) 10 MG tablet Take 1 tablet (10 mg) by mouth 4 times daily as needed for moderate pain 20 tablet 0     norethindrone-ethinyl estradiol-iron (ESTROSTEP FE) 1-20/1-30/1-35 MG-MCG per tablet Take 1 tablet by mouth daily       dexamethasone (DECADRON) 4 MG/ML injection Use 4 mg or dose determined by provider for iontophoresis. 5 mL 0            OBJECTIVE:       Vitals:   Vitals:    01/22/22 1111   Weight: 68 kg (150 lb)   Height: 1.651 m (5' 5\")     BMI: Body mass index is 24.96 kg/m .  Vital Signs: Ht 1.651 m (5' 5\")   Wt 68 kg (150 lb)   BMI 24.96 kg/m   Patient declined being weighed. Body mass index is 24.96 kg/m .    General  - normal appearance, in no obvious distress  Musculoskeletal - ankle  - stance: " normal gait without limp  - inspection: no swelling or effusion,  normal bone and joint alignment, no obvious deformity  - palpation: no soft tissue tenderness, malleoli and tarsal bones non-tender  - ROM: normal dorsiflexion, plantar flexion, inversion, eversion, not painful  - strength: 5/5 in all planes  - special tests:  (-) anterior drawer  (-) talar tilt  (-) Tinel's  Neuro  - no sensory or motor deficit, grossly normal coordination, normal muscle tone  Skin  - no ecchymosis, erythema, warmth, or induration, no obvious rash    Ultrasound Evaluation : thickening of the posterior tibial tendon with a scant effusion. Mild degree of hyperemia with doppler suggesting some degree of active inflammation. No tenderness with sonopalpation.           ASSESSMENT and PLAN:     Abdelrahman was seen today for recheck.    Diagnoses and all orders for this visit:    Posterior tibial tendinitis of right lower extremity: Treatment options discussed at length which included rest, continued therapy, oral anti-inflammatories, CSI, and repeat PRP. Given mild symptoms at this time and desire to be full strength for the Spring season the patient opted for prn oral anti-inflammatories at this time. We did discuss the option of a tendon sheath injection ~3 weeks before the start of the outdoor season and the patient will think about this and contact us if pain necessitates this intervention. Plan for follow up prn.   -     ketorolac (TORADOL) 10 MG tablet; Take 1 tablet (10 mg) by mouth 4 times daily as needed for moderate pain      Options for treatment and/or follow-up care were reviewed with the patient was actively involved in the decision making process. Patient verbalized understanding and was in agreement with the plan.    The patient was seen by and discussed with Dr.Suzanne YVETTE Kaiser MD, CAQ, CCD    Ryan Dash DO PGY-4  [facility] Sports Medicine Fellow 2021-22

## 2022-01-26 NOTE — PROGRESS NOTES
Abdelrahman is a 21 year old who is being evaluated via a billable telephone visit.      What phone number would you like to be contacted at? [phone]  How would you like to obtain your AVS? Demar  Phone call duration: 11 minutes    S:  Telephone call with Dasha and Foreign Stringer to update Dasha on the treatment plan developed with myself, Foreign and  Michele Aguilar earlier today.     O:  Sounds appropriate.      A:  Chronic PT tendinopathy      P:    Jeff shared his concern that Dasha hasn't been able to train much this year due to her tendinitis problem and really needs an 8 week cycle of training in order to make the rely team that will compete at outdoor [employer] in May.  Given this concern and that Dasha is done with track following this outdoor season we will follow this treatment plan;  1. Rest and rehab and cross training until Feb 6  -Started iontophoresis round 2 yesterday and will do this for a 5 day cycle.  2. Feb 7:  Resume sprint training.  Trial of oral Toradol 10mg; one pill with breakfast and one with lunch prior to afternoon training on Feb 7th.  Dasha will let Foreign know how she responds to that medication.  We may have her repeat it on Feb 10 for harder sprint training  3. CSI tendon sheath injection on Feb 11 morning.   4. Resume training on Feb 14 afternoon  5.  Monitor her symptoms and train with some modifications as per redd Aguilar (ie not back to back hard sprinting days, include cross training non impact  6.  Aim for her to compete on in the April 1st meet.     Dasha was in agreement with this plan and is hopeful that she will be able to train more effectively.      Foreign Stringer ATC was on the entire telephone appt.    Faye Kaiser MD, CAQ, FACSM, CCD  [facility]  Sports Medicine and Bone Health  Team Physician;  Athletics

## 2022-01-26 NOTE — LETTER
Date:January 27, 2022      Patient was self referred, no letter generated. Do not send.        [facility] Health Information

## 2022-01-26 NOTE — LETTER
Date:January 27, 2022      Patient was self referred, no letter generated. Do not send.        [facility] Health Information

## 2022-01-27 ENCOUNTER — DOCUMENTATION ONLY (OUTPATIENT)
Dept: FAMILY MEDICINE | Facility: CLINIC | Age: 22
End: 2022-01-27
Payer: COMMERCIAL

## 2022-01-28 NOTE — PROGRESS NOTES
"[facility] ATHLETIC MEDICINE  [facility]   Sport Psychology Intake Note      Location of Visit: [facility] Athletic Department  Date of Visit: 1/27/2022  Duration of Session: 45-50 minutes  Referred by:     Abdelrahman Foreman presented on time for initial telehealth/videoconference. Abdelrahman Foreman was located at the following address for the appointment: [address] [place] [id number]     Emergency contacts provided:  General: Newton Foreman - [phone]  In-person: Forrest Singh - [phone]     The risks and benefits of telehealth and what to do if there is a break in the connection were reviewed. Physical environment/space was confirmed to be secure and private on both ends. The session was both audio and visual on [employer], a secure system that is HIPAA compliant/certified. The telehealth consent form was signed prior to the session; see signed form in chart. The nature and limits of confidentiality, were discussed and Abdelrahman Foreman stated understanding and consent.      Abdelrahman Foreman is a 21 year old [place] female.  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 She is not an international student.     Self-reported Concerns/Symptoms:  Academic concerns  Body image  Depression/low mood  Injury/recovery support  Low motivation    Presenting Concern:  Client reported her ATC encouraged meeting with sport psych to address her ankle injury and struggles with being away from teammates, mixed messages from others about her recovery process, and a sense of \"feeling stuck.\" Client reported a sense of burnout with sport but feeling ready to transition out of sport.    Suicide and Risk Assessment:  Recent suicidal thoughts: No  Past suicidal thoughts: No  Recent homicidal thoughts: No  Any attempts in the past: No  Any family/friends/loved ones die by suicide: No  Plan or considering various methods: No  Access to guns: " No  Protective factors: no h/o suicide attempt, no plan or intent, no h/o risky impulsive behavior, h/o seeking help when needed, symptom improvement, none to minimal alcohol use , commitment to family and good social support    Verbal contract for safety: Yes    Abdelrahman denies current urges to self-harm, homicidal ideation, suicidal ideation, means, plans, or intent.    Mental Status & Observations:  Abdelrahman appeared generally alert and oriented. Dress was appropriate to the weather and occasion. Grooming and hygiene were appropriate. Eye contact was good. Speech was of normal volume and normal. Thought processes were relevant, logical and goal-directed. Thought content was within normal limits with no evidence of psychotic or paranoid features. Memory appeared intact. She exhibited normal motor activity during the appointment. Mood was appropriate with congruent affect. Insight and judgment appeared age appropriate with good focus in session.  Behavior was candid    Family Background:  Client reported she has a good relationship with her dad, step-mom, a decent relationship with her mom, and not much connection with her step-dad. She stated she has 1 sister, 6 half siblings, and 2 step-siblings. She reported she is close with her siblings. The client stated her mom has a history of mental health concerns (depression, eating, anxiety and substance).     Education:  Client denied concerns with academics. 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 The client stated she has enjoyed her [employer] education and denied concerns with a history of academic concerns.     Social:  Client reported enjoying campus life and her time at [employer]. She stated she felt confined to athletics but liked the community.    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 She reported she has been struggling with her injury since September. She reported her favorite event is the  "4x1 due to being able to compete with teammates.    History of medical and mental health concerns:  Concussion: No  Current/past sports injury: Yes: See above  Nutrition/eating/appetite: No  Body image: No  Sleep: No   Substance use (alcohol, caffeine, tobacco, cannabis, other): No  Family history of substance abuse: No  Medications/vitamins/supplements: none  Concentration/focus/ADHD: No  Caffeine use: No  PTSD/trauma/abuse: No  Significant loss: No  Known history of mental health in self: No  History of therapy or prescribed medications: No  Known history of mental health in family member(s): Yes: see above  Legal issues: No  Financial concerns: No   Receives partial scholarship    Coping skills, strengths and supports:   Communication skills and Insight and sensitivity    Goals for counseling:  Space for support addressing her injury     Clinical impressions or Other:  Client was open and engaged throughout. Initially, the client stated she felt \"forced to engage in sport psych\" but at the end of the intake stated she thought it would be helpful to have a \"open space\" to discuss her sense of isolation from her team and desire to move forward with her injury.    Therapy objectives/goals:  Support injury/recovery    Therapy follow-up plan:  Individual counseling sessions as needed      Yoseph Castro, PhD LP      "

## 2022-01-28 NOTE — PROGRESS NOTES
Attending Note:   I have personally examined this patient and have reviewed the clinical presentation and progress note with the fellow. I agree with the treatment plan as outlined. The plan was formulated with the fellow on the day of the patient's visit. I have reviewed all imaging with the fellow and agree with the findings in the documentation.     The plan was communicated to Foreign Stringer ATC by myself via a telephone call     Faye Kaiser MD, CAQ, CCD  [facility]  Sports Medicine and Bone Health

## 2022-01-29 ENCOUNTER — HEALTH MAINTENANCE LETTER (OUTPATIENT)
Age: 22
End: 2022-01-29

## 2022-02-01 ENCOUNTER — DOCUMENTATION ONLY (OUTPATIENT)
Dept: FAMILY MEDICINE | Facility: CLINIC | Age: 22
End: 2022-02-01
Payer: COMMERCIAL

## 2022-02-01 NOTE — PROGRESS NOTES
[facility] ATHLETICS    Physical Therapy follow-up note  Treating therapist: Venessa Tidwell DPT    Date of service performed: Jan 25, 2022    Concern/injury: right post tib    Subjective: Not much change. Did follow up with the doctor. Has been doing BFR.     Objective: uncontrolled right foot pronation during running analysis improved with verbal cuing     DN: 3 40mm to right posterior tibialis with 1x pecking to posterior tibialis tendon - skin check pre and post with no adverse reactions      Assessment/plan: continue to progress exercises/BFR, ongoing running analysis    Exercises Added: foot step over/hop with band around foot and anchored to wall     Leanna Tidwell, PT

## 2022-02-01 NOTE — PROGRESS NOTES
[facility] ATHLETICS    Physical Therapy follow-up note  Treating therapist: Venessa Tidwell DPT    Date of service performed: February 1, 2022    Concern/injury: right post tib     Subjective: Continues to pool and bike. BFR 3x/week. No running since last visit. No running on track until next Friday following injection 2/11. Tolerated dry needling well, no increased soreness.     Objective: right foot pronation with SL activities, improved with verbal and tactile cues     Assessment/plan: continue progression of exercises, additional running analysis next   -Rehab Exercises performed: band step over, squat with band for torsion, lunge + knee drive + band for torsion, hip hike on step, airplane    Cues for neutral foot during all exercises   -Rehab Exercises added: squat with torsion, hip hike    Leanna Tidwell, PT

## 2022-02-08 ENCOUNTER — DOCUMENTATION ONLY (OUTPATIENT)
Dept: FAMILY MEDICINE | Facility: CLINIC | Age: 22
End: 2022-02-08
Payer: COMMERCIAL

## 2022-02-08 NOTE — PROGRESS NOTES
[facility] ATHLETICS    Physical Therapy follow-up note  Treating therapist: Venessa Tidwell DPT    Date of service performed: February 8, 2022    Concern/injury: right post tib     Subjective: Did roll left ankle at practice.  Was able to sprint 10 m prior to onset of pain. Sprinting up to 30 meters at practice with pain.     Objective: right forefoot stiffness noted, limited WB plantarflexion, tenderness over posterior tibialis tendon     Right forefoot mobilizations grade 4 x5 min     Assessment/plan: continue progression of exercises, additional running analysis when able, additional calf stretching and mobility     PT treatment limited due to recent ankle sprain.   Verbal review of rehab plan and exercises     -Rehab Exercises added: none    Leanna Tidwell, PT

## 2022-02-17 ENCOUNTER — DOCUMENTATION ONLY (OUTPATIENT)
Dept: FAMILY MEDICINE | Facility: CLINIC | Age: 22
End: 2022-02-17
Payer: COMMERCIAL

## 2022-02-20 NOTE — PROGRESS NOTES
[facility] ATHLETIC MEDICINE  [facility]   Sport Psychology Progress Note      Location of Visit: [facility]  Date of Visit: 2/17/2022  Duration of Session: 45-50 minutes    Abdelrahman Foreman presented on time for initial telehealth/videoconference. Abdelrahman Foreman was located at the following address for the appointment: [address] [place] [id number]     Emergency contacts provided:  General: Newton Foreman - [phone]  In-person: Forrest Singh - [phone]     The risks and benefits of telehealth and what to do if there is a break in the connection were reviewed. Physical environment/space was confirmed to be secure and private on both ends. The session was both audio and visual on Simple [employer], a secure system that is HIPAA compliant/certified. The telehealth consent form was signed prior to the session; see signed form in chart. The nature and limits of confidentiality, were discussed and Abdelrahman Foreman stated understanding and consent.      Suicide Assessment:  Client denies current urges to self-harm, suicidal ideation, homicidal ideation, plan, intent, or means.    Mental Status & Observations:  Abdelrahman appeared generally alert and oriented. Dress was appropriate to the weather and occasion. Grooming and hygiene were appropriate. Eye contact was good. Speech was of normal volume and normal. Mood was appropriate with congruent affect. Thought processes were relevant, logical and goal-directed. Thought content was within normal limits with no evidence of psychotic or paranoid features. Memory appeared intact. Insight and judgment appeared age appropriate with good focus in session.  She exhibited normal motor activity during the appointment.  Behavior was cooperative and open.      Observations and response to counseling:  Client was attentive, engaged, and open throughout. The client reported working with a specialist for her foot was helpful and is  currently trying to figure out options for either continuing this season or getting surgery and retiring from sport.    Intervention:  Time was spent building rapport and processing client's thoughts around her ankle injury. The client reported she found it helpful to see a specialist and have her  with her to discuss options. The client stated she is unsure about what she wants to do. Her concerns and uncertainty were validated. When queried, the client stated she practiced this past week and rated her pain after 30 meters about a 4 out of 10. When queried, the client reported she thinks she would be able to handle a 2-3 rating of pain and stated she would continue taking her pain medication prescribed and keep track of how she is feeling physically and mentally during the upcoming week of practice.    Goals for counseling:  Injury recovery; process transition out of sport    Therapy objectives/goals:  Assist with transition out of sport  Build resilience and response to adversity  Support injury/recovery    Therapy follow-up plan:  Individual counseling sessions as needed      Yoseph Castro, PhD LP

## 2022-02-22 ENCOUNTER — DOCUMENTATION ONLY (OUTPATIENT)
Dept: FAMILY MEDICINE | Facility: CLINIC | Age: 22
End: 2022-02-22
Payer: COMMERCIAL

## 2022-02-22 NOTE — PROGRESS NOTES
[facility] ATHLETICS    Physical Therapy follow-up note  Treating therapist: Venessa Tidwell DPT    Date of service performed: February 22, 2022    Concern/injury: right post tib     Subjective:   Foot has been felling better. Doing more activity without increased pain levels. Warm ups can be very irritating and tiring.     Objective:   Pain through post tib tendon during soleus stretch, with use of medial heel wedge stretch is in soleus vs post tib     R WB PF: 9 cm (unable to check unaffected side due to recent ankle sprain)     Trialed use of medial wedge with heel raises     DN 4x 50mm to post tib musculature - skin check pre and post     Assessment/Plan:   Continue progression of exercises, additional running analysis when able, additional calf stretching and mobility     Discuss with  changing warm up routine to avoid overloading with hopping/jumping    Medial heel wedge +/- orthotics for daily activities and exercises - can use for track as well if able to fit in spikes     -Rehab Exercises performed: single leg squat with torsion + grey band  -Rehab Exercises added: none    Leanna Tidwell, PT

## 2022-03-03 ENCOUNTER — DOCUMENTATION ONLY (OUTPATIENT)
Dept: FAMILY MEDICINE | Facility: CLINIC | Age: 22
End: 2022-03-03
Payer: COMMERCIAL

## 2022-03-04 NOTE — PROGRESS NOTES
[facility] ATHLETIC MEDICINE  [facility]   Sport Psychology Progress Note      Location of Visit: [facility] Athletic [facility]  Date of Visit: 3/3/2022  Duration of Session: 45-50 minutes    Abdelrahman Foreman presented on time for initial telehealth/videoconference. Abdelrahman Foreman was located at the following address for the appointment: [address] [place] [address]     Emergency contacts provided:  General: Newton Foreman - [phone]  In-person: Forrest Singh - [phone]     The risks and benefits of telehealth and what to do if there is a break in the connection were reviewed. Physical environment/space was confirmed to be secure and private on both ends. The session was both audio and visual on Simple Practice, a secure system that is HIPAA compliant/certified. The telehealth consent form was signed prior to the session; see signed form in chart. The nature and limits of confidentiality, were discussed and Abdelrahman Foreman stated understanding and consent.      Suicide Assessment:  Client denies current urges to self-harm, suicidal ideation, homicidal ideation, plan, intent, or means.    Mental Status & Observations:  Abdelrahman appeared generally alert and oriented. Dress was appropriate to the weather and occasion. Grooming and hygiene were appropriate. Eye contact was good. Speech was of normal volume and normal. Mood was appropriate with congruent affect. Thought processes were relevant, logical and goal-directed. Thought content was within normal limits with no evidence of psychotic or paranoid features. Memory appeared intact. Insight and judgment appeared age appropriate with good focus in session.  She exhibited normal motor activity during the appointment.  Behavior was cooperative and open.      Observations and response to counseling:  Client was attentive, engaged, and open throughout. The client reported she has mixed emotions with her decision (stated it is the  right decision for her) to retire from sport and get surgery on her foot. Client reported it was best for her mentally and physically to opt out of the outdoor season. Client reported feeling supported by parents, coaches, and teammates.    Intervention:  Client was validated throughout and time was spent acknowledging her mixed emotions with retiring from sport. The client stated she struggles thinking about not competing anymore but also appreciated the support from her  and parents. She stated she was also given the option to still workout and train with her teammates to end the spring season.    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     We agreed to transition goals to focus on transition out of sport for sessions moving forward.    Goals for counseling:  Injury recovery; process transition out of sport    Therapy objectives/goals:  Assist with transition out of sport  Build resilience and response to adversity  Support injury/recovery    Therapy follow-up plan:  Individual counseling sessions as needed  Transition workbook interventions      Yoseph Castro, PhD LP

## 2022-03-14 ENCOUNTER — OFFICE VISIT (OUTPATIENT)
Dept: FAMILY MEDICINE | Facility: CLINIC | Age: 22
End: 2022-03-14
Payer: COMMERCIAL

## 2022-03-14 VITALS
BODY MASS INDEX: 25.66 KG/M2 | HEART RATE: 91 BPM | SYSTOLIC BLOOD PRESSURE: 122 MMHG | HEIGHT: 65 IN | DIASTOLIC BLOOD PRESSURE: 90 MMHG | WEIGHT: 154 LBS

## 2022-03-14 DIAGNOSIS — Z01.818 PRE-OPERATIVE GENERAL PHYSICAL EXAMINATION: Primary | ICD-10-CM

## 2022-03-14 NOTE — LETTER
Date:March 15, 2022      Patient was self referred, no letter generated. Do not send.        [facility] Health Information

## 2022-03-14 NOTE — LETTER
3/14/2022      RE: Abdelrahman Foreman  [address]  [facility] [address]       Here for a pre-op physical for R posterior tibialis tendinopathy chronic with Dr. Lowell Bray on April 1, 2022.  See scanned form for details.  Requires a UPT.  PCN Allergy: Hives.     Dr. Erwin, resident was present and examined the patient as well.     Recheck pulse:  64  BP:  117/82    Foreign Stringer ATC was also present for the entire appt.       Faye Kaiser MD, CAQ, FACSM, CCD  [facility]  Sports Medicine and Bone Health  Team Physician;  Athletics        Faye Kaiser MD

## 2022-03-14 NOTE — PROGRESS NOTES
Here for a pre-op physical for R posterior tibialis tendinopathy chronic with Dr. Lowell Bray on April 1, 2022.  See scanned form for details.  Requires a UPT.  PCN Allergy: Hives.     Dr. Erwin, resident was present and examined the patient as well.     Recheck pulse:  64  BP:  117/82    Foreign Stringer ATC was also present for the entire appt.       Faye Kaiser MD, CAQ, FACSM, CCD  [facility]  Sports Medicine and Bone Health  Team Physician;  Athletics

## 2022-03-15 ENCOUNTER — DOCUMENTATION ONLY (OUTPATIENT)
Dept: FAMILY MEDICINE | Facility: CLINIC | Age: 22
End: 2022-03-15
Payer: COMMERCIAL

## 2022-03-17 ENCOUNTER — DOCUMENTATION ONLY (OUTPATIENT)
Dept: FAMILY MEDICINE | Facility: CLINIC | Age: 22
End: 2022-03-17
Payer: COMMERCIAL

## 2022-03-17 NOTE — PROGRESS NOTES
[facility] ATHLETIC MEDICINE  [facility]   Sport Psychology Progress Note      Location of Visit: [facility] Athletic Department  Date of Visit: 3/17/2022  Duration of Session: 45-50 minutes    Abdelrahman Foreman presented on time for initial telehealth/videoconference. Abdelrahman Foreman was located at the following address for the appointment: [address] [place] [address]     Emergency contacts provided:  General: Newton Foreman - [phone]  In-person: Forrest Singh - [phone]     The risks and benefits of telehealth and what to do if there is a break in the connection were reviewed. Physical environment/space was confirmed to be secure and private on both ends. The session was both audio and visual on Simple Practice, a secure system that is HIPAA compliant/certified. The telehealth consent form was signed prior to the session; see signed form in chart. The nature and limits of confidentiality, were discussed and Abdelrahman Foreman stated understanding and consent.      Suicide Assessment:  Client denies current urges to self-harm, suicidal ideation, homicidal ideation, plan, intent, or means.    Mental Status & Observations:  Abdelrahman appeared generally alert and oriented. Dress was appropriate to the weather and occasion. Grooming and hygiene were appropriate. Eye contact was good. Speech was of normal volume and normal. Mood was appropriate with congruent affect. Thought processes were relevant, logical and goal-directed. Thought content was within normal limits with no evidence of psychotic or paranoid features. Memory appeared intact. Insight and judgment appeared age appropriate with good focus in session.  She exhibited normal motor activity during the appointment.  Behavior was cooperative and open.      Observations and response to counseling:  Client was attentive, engaged, and open throughout. The client reported she has mixed emotions with her decision (stated it is the  right decision for her) to retire from sport and get surgery on her foot. Client reported it was best for her mentally and physically to opt out of the outdoor season. Client reported feeling supported by parents, coaches, and teammates.    Client will have surgery April 1st.    Intervention:  Client and provider processed preparation for surgery and mindset with workouts to prepare body physically. Client seemed prepared and expressed a low level of stress related to her surgery coming up. Client also reported engaging in meaningful behaviors for the next couple of weeks. Client was validated regarding her sense of anticipating stress with the recovery period and being inactive those weeks. We agreed to follow-up after her first post-operation session to address her recovery process.    Goals for counseling:  Injury recovery; process transition out of sport    Therapy objectives/goals:  Assist with transition out of sport  Build resilience and response to adversity  Support injury/recovery    Therapy follow-up plan:  Individual counseling sessions as needed  Transition workbook interventions      Yoseph Castro, PhD LP

## 2022-03-24 NOTE — PROGRESS NOTES
[facility] ATHLETICS    Physical Therapy follow-up note  Treating therapist: Venessa Tidwell DPT    Date of service performed: March 15, 2022    Concern/injury: right post tib     Subjective:   Leg has been feeling okay as she hasn't been running or working out as much. Is scheduled for surgery in April.     Objective:   Decreased soleus strength  Improved neutral foot position with WB activities       Assessment/Plan:   Continue with prehab exercises prior to surgery. Modalities as needed.     -Rehab Exercises performed: DL heel raise, SL heel raise - ceased due to pain, BW squat, lunge walk, lateral band walk, BOSU airplane, BOSU ball toss, BOSU SL squat, BAPS IV/EV + resistance  -Rehab Exercises added: DL heel raise, lateral band walk, BAPS IV/EV, soleus stretching     Leanna Tidwell, PT

## 2022-03-28 ENCOUNTER — LAB (OUTPATIENT)
Dept: LAB | Facility: CLINIC | Age: 22
End: 2022-03-28
Payer: COMMERCIAL

## 2022-03-28 DIAGNOSIS — Z01.818 PRE-OPERATIVE GENERAL PHYSICAL EXAMINATION: ICD-10-CM

## 2022-03-28 LAB — HCG UR QL: NEGATIVE

## 2022-03-28 PROCEDURE — 81025 URINE PREGNANCY TEST: CPT | Performed by: PATHOLOGY

## 2022-04-14 ENCOUNTER — DOCUMENTATION ONLY (OUTPATIENT)
Dept: FAMILY MEDICINE | Facility: CLINIC | Age: 22
End: 2022-04-14
Payer: COMMERCIAL

## 2022-04-14 NOTE — PROGRESS NOTES
[facility] ATHLETIC MEDICINE  [facility]   Sport Psychology Progress Note      [facility]  Date of Visit: 4/14/2022  Duration of Session: 45-50 minutes    Abdelrahman Foreman presented on time for initial telehealth/videoconference. Abdelrahman Foreman was located at the following address for the appointment: [address] [place] [address]     Emergency contacts provided:  General: Newton Foreman - [phone]  In-person: Forrest Singh - [phone]     The risks and benefits of telehealth and what to do if there is a break in the connection were reviewed. Physical environment/space was confirmed to be secure and private on both ends. The session was both audio and visual on Simple Practice, a secure system that is HIPAA compliant/certified. The telehealth consent form was signed prior to the session; see signed form in chart. The nature and limits of confidentiality, were discussed and Abdelrahman Foreman stated understanding and consent.      Suicide Assessment:  Client denies current urges to self-harm, suicidal ideation, homicidal ideation, plan, intent, or means.    Mental Status & Observations:  Abdelrahman appeared generally alert and oriented. Dress was appropriate to the weather and occasion. Grooming and hygiene were appropriate. Eye contact was good. Speech was of normal volume and normal. Mood was appropriate with congruent affect. Thought processes were relevant, logical and goal-directed. Thought content was within normal limits with no evidence of psychotic or paranoid features. Memory appeared intact. Insight and judgment appeared age appropriate with good focus in session.  She exhibited normal motor activity during the appointment.  Behavior was cooperative and open.      Observations and response to counseling:  Client was attentive, engaged, and open throughout. Client reported a sense of completing initial treatment goals navigating her  "injury recovery and transition out of sport. The client reported perspective taking and processing \"what if\" thoughts were helpful to address in session.    Intervention:  Client and provider processed the client's mindset around her current injury recovery process. The client reported holding onto various emotions while feeling validated in session. The client stated some of her initial concerns with being on crutches was \"being a burden on others\" but was able to identify and recognize how her friends, boyfriend, and family members have been \"happy to be supportive\" and have expressed their support to her.     When queried, the client stated she feels excited thinking about graduation, plans to travel over the summer, looking forward to PT in 4 weeks to get strength back in her ankle, and looking forward to her job at [employer] starting in July. 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    Goals for counseling:  Client and provider agreed the client has completed her treatment goals for sport psychology sessions (see below). The client was thankful for the provider's time. The provider encouraged the client to reach back out if she feels the need for services or referral options in the future.     Therapy objectives/goals:  Assist with transition out of sport  Build resilience and response to adversity  Support injury/recovery    Therapy follow-up plan:  Treatment goals met; no follow-up plan at this time      Yoseph Castro, PhD LP  "

## 2022-04-28 ENCOUNTER — DOCUMENTATION ONLY (OUTPATIENT)
Dept: FAMILY MEDICINE | Facility: CLINIC | Age: 22
End: 2022-04-28
Payer: COMMERCIAL

## 2022-04-28 NOTE — PROGRESS NOTES
[facility] ATHLETICS    Physical Therapy initial assessment note  Treating therapist: Venessa Tidwell DPT  Date of service performed: April 28, 2022  Sport: [employer]    Concern: Acute injury  Body part: Ankle  Description: Right  Injury: Tear  Type: Athletics related  Date of injury: 4/1/2022    S: Doing well since surgery. Has been out of the boot some working on range of motion. No glute or core exercises currently.     O:   R: DF 5 degrees, PF 30 degrees, EV 10 degrees     A: Doing well post operatively. Demonstrates adequate range of motion in DF PF and EV. Tolerated glute and core exercises well and these were added to rehabilitation program. Continue with PT and progress per post operative protocol.     Exercises:  PF  DF  EV  Big Toe Flex  Clamshell  Side Ly Hip Abd  Glute Kick Backs   Bike in boot       P: weekly PT for exercise progressions     Leanna Tidwell, PT          
PCP - Dr. Kristen Hermosillo [phone]  Pain Management - Dr. Pastora Cantu [phone]

## 2022-05-03 ENCOUNTER — DOCUMENTATION ONLY (OUTPATIENT)
Dept: FAMILY MEDICINE | Facility: CLINIC | Age: 22
End: 2022-05-03
Payer: COMMERCIAL

## 2022-05-03 NOTE — PROGRESS NOTES
[facility] ATHLETICS    Physical Therapy follow-up note  Treating therapist: Venessa Tidwell DPT    Date of service performed: May 3, 2022    Concern/injury: post op right post tib repair 4/1/2022    Subjective: Felt good after last session. Has been working in the weight room more doing hamstring and core exercises.     Objective:   -10 degrees PF - limited by pulling over the incision  DF equivalent to unaffected side   EV equivalent to unaffected side     Assessment/plan: Continue progressing glute and core exercises as able. Mobility is limited by pulling on the medial side of the ankle however demonstrates adequate range at this time. Can add quadriceps and hamstring exercises with strength and conditioning with the use of boot. Meets with surgeon 5/10/2022.    -Rehab Exercises performed:    SLR 2x20 7lbs   Side Ly Hip Abd 2x20 + 20 pulses 7lbs   Quadruped Hip Ext 2x20 + 20 pulses    Bridges on Ball 2x20    Bridges on Ball + Marching 2x20    LAQ 2x20 17lbs   -Rehab Exercises added: Big toe flexion/extension, leg extensions with boot     Leanna Tidwell, PT

## 2022-05-11 ENCOUNTER — OFFICE VISIT (OUTPATIENT)
Dept: ORTHOPEDICS | Facility: CLINIC | Age: 22
End: 2022-05-11
Payer: COMMERCIAL

## 2022-05-11 DIAGNOSIS — M76.821 POSTERIOR TIBIAL TENDINITIS OF RIGHT LOWER EXTREMITY: Primary | ICD-10-CM

## 2022-05-11 NOTE — LETTER
Date:May 12, 2022      Patient was self referred, no letter generated. Do not send.        [facility] Health Information

## 2022-05-11 NOTE — LETTER
5/11/2022      RE: Abdelrahman Foreman  [address] [facility] [address]     Dear Colleague,    Thank you for referring your patient, Abdelrahman Foreman, to the [facility]. Please see a copy of my visit note below.    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 She recently had surgery with Dr. Bray on 4/1/22 for right posterior tibialis tendinopathy. Out of boot now, starting rehab with PT Venessa tomorrow. Recovering well.     She has also had some intermittent low back pain - recently more pain with crutching, left low back pain with forward flexion, and pain with sneeze, but no radiating pain or N/T, bowel or bladder changes.    H/o right plantar fasciitis, hamstring strain which are currently better.     She has otherwise been healthy. Has not had COVID. Vaccinated and boosted.      She will be and  with [facility].         Medications:     Current Outpatient Medications   Medication Sig     dexamethasone (DECADRON) 4 MG/ML injection Use 4 mg or dose determined by provider for iontophoresis. (Patient not taking: Reported on 3/14/2022)     norethindrone-ethinyl estradiol-iron (ESTROSTEP FE) 1-20/1-30/1-35 MG-MCG per tablet Take 1 tablet by mouth daily     Current Facility-Administered Medications   Medication     lidocaine (PF) (XYLOCAINE) 1 % injection 3 mL     triamcinolone (KENALOG-40) injection 40 mg             Allergies:      Allergies   Allergen Reactions     Penicillin G             Review of Systems:   A comprehensive 10 point review of systems (constitutional, ENT, cardiac, peripheral vascular, respiratory, GI, , Musculoskeletal, skin, Neurological) was performed and found to be negative except as described in this note.           Physical Exam:   COMPLETE  EXAMINATION:   VITAL SIGNS: There were no vitals taken for this visit.  GEN: Alert, well-nourished, and in no distress.   HEENT:   Head: Normocephalic and atraumatic.   Eyes: PERRLA, EOMI  Nose: no congestion or discharge  Ears: TM's normal, external canals normal  Mouth/Throat: MMM, No erythema or exudate.   Neck: supple, no lymphadenopathy  CV: S1S2 normal, RRR, no murmur  CHEST: CTA, Easy effort, No rales or wheezes  ABD: Soft. Nontender/nondistended, no HSM/mass, no rebound/guarding.  NEURO: Alert and oriented to person, place, and time. Gait normal. Coordination normal.    SKIN: No rash, warmth or erythema  PSYCH: Mood, memory, affect and judgment normal.   MSK: right ankle: healing surgical incision along distal posterior tibialis, she has good ankle ROM and strength intact, minimal swelling         Imaging:     MR right ankle without  contrast 10/19/2021 5:08 PM     History: [employer] with chronic PT tendon pain;  Posterior tibial tendinitis of right lower extremity     Techniques: Multiplanar multisequence imaging of the right ankle was  obtained without  administration of intra-articular or intravenous  contrast.     Comparison: 4/19/2021     Findings: Marker noted over the medial ankle.     MEDIAL COMPARTMENT     Tendons: Increasing tenosynovial fluid about the tibialis posterior  tendon proximal and distal to the medial malleolus with worsening  tendinosis and worsening intrasubstance tearing compared to prior MRI.  No full-thickness tear.     Ligaments: Deltoid and spring ligamentous complexes are intact.     LATERAL COMPARTMENT     Tendons: Peroneal tendons are intact.     Ligaments: Lateral ligamentous complex and syndesmotic ligamentous  complex are intact.     POSTERIOR COMPARTMENT     Achilles tendon: Intact. Mild fluid in the retrocalcaneal bursa,  similar to prior.     Plantar fascia: Mild old soft tissue edema superficial and deep to the  central cord of the plantar fascia,  similar to prior MRI.     ANTERIOR COMPARTMENT     Tendons: Anterior extensor tendons are intact.     JOINTS     Small ankle joint effusion.     GENERAL FINDINGS     Bones: No fracture, marrow contusion or infiltrative change. No talar  dome osteochondral lesion.     Muscles: Normal.     Tarsal tunnel: Normal.      Sinus tarsi: Normal.      ANCILLARY FINDINGS     None                                                                      Impression: Compared to prior MRI 4/19/2021:     1. Worsening tibialis posterior pathology.  *  Increasing tenosynovial fluid about the tibialis posterior  (tenosynovitis).  *  Worsening tendinosis and mild worsening of intrasubstance tearing  of the distal tibialis posterior.     2. Mild plantar fasciitis, similar to prior.     AMARIS (Elidia COMBS MD            Assessment:      Exit eval  Right posterior tibialis tendinopathy and partial tearing S/P repair with Dr. Bray  Resolved right plantar fasciitis and hamstring strain        Plan:     1. Continue rehab with PT and follow up as scheduled with Dr. Bray.  2. 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 Discussed transitioning care to a primary care physician. Over the next 2 years, if she should have problems with any of her athletic injuries, she may follow up with [facility].     Verona Oconnor M.D.    ATC * was present for the entire visit.        Again, thank you for allowing me to participate in the care of your patient.      Sincerely,    Verona Oconnor MD

## 2022-05-11 NOTE — PROGRESS NOTES
[facility] FOLLOW UP    Abdelrahman Foreman MRN# [id number]   Age: 21 year old YOB: 2000           Chief Complaint:     Exit Eval          History of Present Illness:     20 yo [employer] here for exit evaluation as she will be graduating. She recently had surgery with Dr. Bray on 4/1/22 for right posterior tibialis tendinopathy. Out of boot now, starting rehab with PT Venessa tomorrow. Recovering well.     She has also had some intermittent low back pain - recently more pain with crutching, left low back pain with forward flexion, and pain with sneeze, but no radiating pain or N/T, bowel or bladder changes.    H/o right plantar fasciitis, hamstring strain which are currently better.     She has otherwise been healthy. Has not had COVID. Vaccinated and boosted.      She will be and  with [facility].         Medications:     Current Outpatient Medications   Medication Sig     dexamethasone (DECADRON) 4 MG/ML injection Use 4 mg or dose determined by provider for iontophoresis. (Patient not taking: Reported on 3/14/2022)     norethindrone-ethinyl estradiol-iron (ESTROSTEP FE) 1-20/1-30/1-35 MG-MCG per tablet Take 1 tablet by mouth daily     Current Facility-Administered Medications   Medication     lidocaine (PF) (XYLOCAINE) 1 % injection 3 mL     triamcinolone (KENALOG-40) injection 40 mg             Allergies:      Allergies   Allergen Reactions     Penicillin G             Review of Systems:   A comprehensive 10 point review of systems (constitutional, ENT, cardiac, peripheral vascular, respiratory, GI, , Musculoskeletal, skin, Neurological) was performed and found to be negative except as described in this note.           Physical Exam:   COMPLETE EXAMINATION:   VITAL SIGNS: There were no vitals taken for this visit.  GEN: Alert, well-nourished, and in no distress.   HEENT:   Head: Normocephalic and atraumatic.   Eyes: PERRLA, EOMI  Nose: no congestion or discharge  Ears: TM's  normal, external canals normal  Mouth/Throat: MMM, No erythema or exudate.   Neck: supple, no lymphadenopathy  CV: S1S2 normal, RRR, no murmur  CHEST: CTA, Easy effort, No rales or wheezes  ABD: Soft. Nontender/nondistended, no HSM/mass, no rebound/guarding.  NEURO: Alert and oriented to person, place, and time. Gait normal. Coordination normal.    SKIN: No rash, warmth or erythema  PSYCH: Mood, memory, affect and judgment normal.   MSK: right ankle: healing surgical incision along distal posterior tibialis, she has good ankle ROM and strength intact, minimal swelling         Imaging:     MR right ankle without  contrast 10/19/2021 5:08 PM     History: [employer] with chronic PT tendon pain;  Posterior tibial tendinitis of right lower extremity     Techniques: Multiplanar multisequence imaging of the right ankle was  obtained without  administration of intra-articular or intravenous  contrast.     Comparison: 4/19/2021     Findings: Marker noted over the medial ankle.     MEDIAL COMPARTMENT     Tendons: Increasing tenosynovial fluid about the tibialis posterior  tendon proximal and distal to the medial malleolus with worsening  tendinosis and worsening intrasubstance tearing compared to prior MRI.  No full-thickness tear.     Ligaments: Deltoid and spring ligamentous complexes are intact.     LATERAL COMPARTMENT     Tendons: Peroneal tendons are intact.     Ligaments: Lateral ligamentous complex and syndesmotic ligamentous  complex are intact.     POSTERIOR COMPARTMENT     Achilles tendon: Intact. Mild fluid in the retrocalcaneal bursa,  similar to prior.     Plantar fascia: Mild old soft tissue edema superficial and deep to the  central cord of the plantar fascia, similar to prior MRI.     ANTERIOR COMPARTMENT     Tendons: Anterior extensor tendons are intact.     JOINTS     Small ankle joint effusion.     GENERAL FINDINGS     Bones: No fracture, marrow contusion or infiltrative change. No  talar  dome osteochondral lesion.     Muscles: Normal.     Tarsal tunnel: Normal.      Sinus tarsi: Normal.      ANCILLARY FINDINGS     None                                                                      Impression: Compared to prior MRI 4/19/2021:     1. Worsening tibialis posterior pathology.  *  Increasing tenosynovial fluid about the tibialis posterior  (tenosynovitis).  *  Worsening tendinosis and mild worsening of intrasubstance tearing  of the distal tibialis posterior.     2. Mild plantar fasciitis, similar to prior.     AMARIS COMBS MD (Joe)            Assessment:      Exit eval  Right posterior tibialis tendinopathy and partial tearing S/P repair with Dr. Bray  Resolved right plantar fasciitis and hamstring strain        Plan:     1. Continue rehab with PT and follow up as scheduled with Dr. Bray.  2. 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 Discussed transitioning care to a primary care physician. Over the next 2 years, if she should have problems with any of her athletic injuries, she may follow up with [facility].     Verona Oconnor M.D.    ATC * was present for the entire visit.

## 2022-05-12 ENCOUNTER — DOCUMENTATION ONLY (OUTPATIENT)
Dept: FAMILY MEDICINE | Facility: CLINIC | Age: 22
End: 2022-05-12
Payer: COMMERCIAL

## 2022-05-17 ENCOUNTER — DOCUMENTATION ONLY (OUTPATIENT)
Dept: FAMILY MEDICINE | Facility: CLINIC | Age: 22
End: 2022-05-17
Payer: COMMERCIAL

## 2022-05-19 NOTE — PROGRESS NOTES
[facility] ATHLETICS    Physical Therapy follow-up note  Treating therapist: Venessa Tidwell DPT    Date of service performed: May 17, 2022    Concern/injury: post op right post tib repair 4/1/2022    Subjective: No pain, some increased swelling from being on her feet throughout the weekend. Exercises are going well. No change in back pain.     Objective:   -10 degrees PF - limited by pulling over the incision  DF equivalent to unaffected side   EV equivalent to unaffected side     Pain over left PSIS    Assessment/plan: Continue with BFR. Continue with BW only weight bearing exercises until next phase of rehab. Monitor LBP.     -Rehab Exercises performed:    Foot Yoga: arch form/collapse, toe sprawl, big toe extension, 4 toe extension    BFR: 69% 145 - LAQ 5# 30/15/15/15, TKE 30/15/15/15    Can continue this 3x/week        -Rehab Exercises added: foot yoga     Discussed strengthening exercises - okay with open chain exercise, no closed chain exercise with resistance     Leanna Tidwell, PT

## 2022-05-19 NOTE — PROGRESS NOTES
[facility] ATHLETICS    Physical Therapy follow-up note  Treating therapist: Venessa Tidwell DPT    Date of service performed: May 12, 2022    Concern/injury: post op right post tib repair 4/1/2022    Subjective: Had a follow up with the surgeon. Happy with progress. Able to progress out of the walking boot and use brace. No issue with BRF.     Objective:   -10 degrees PF - limited by pulling over the incision  DF equivalent to unaffected side   EV equivalent to unaffected side     Pain over left PSIS    Assessment/plan: Continue progressing glute and core exercises as able. Gait re-training, progressed WB strengthening as able. BFR next session for quadriceps strength.     -Rehab Exercises performed:    Treadmill pawing x5 min    BW squat x10    Dead bug progression with weight    Standing hip abduction   Standing hip extension    Standing [facility] MET + education       -Rehab Exercises added: progressed hip exercises to standing as listed above, BW squat with decreased anterior knee translation     Leanna Tidwell, PT

## 2022-05-24 ENCOUNTER — DOCUMENTATION ONLY (OUTPATIENT)
Dept: FAMILY MEDICINE | Facility: CLINIC | Age: 22
End: 2022-05-24
Payer: COMMERCIAL

## 2022-05-26 NOTE — PROGRESS NOTES
[facility] ATHLETICS    Physical Therapy follow-up note  Treating therapist: Venessa Tidwell DPT    Date of service performed: May 24, 2022    Concern/injury: post op right post tib repair 4/1/2022    Subjective: Swelling has been improved. No pain. Exercises going well, foot yoga continues to be challenging. Did BFR x1 since last visit.     Objective:   -5 degrees PF - limited by pulling over the incision  DF equivalent to unaffected side   EV equivalent to unaffected side     Pain over left PSIS    Assessment/plan: Continue with BFR. Continue with BW only weight bearing exercises until next phase of rehab. Monitor LBP.     -Rehab Exercises performed:    Bike x5 min    Foot Yoga: arch form/collapse, toe sprawl, big toe extension, 4 toe extension    Isometric IV   Banded PF    Banded PF + Big Toe Flex    Banded DF    BAPS Level 1 IV/DF/PF + Half Circles       Anterior SIJ Mobilization   SI Gapping       -Rehab Exercises added: PF/DF, isometric IV       Leanna Tidwell, PT

## 2022-06-01 ENCOUNTER — DOCUMENTATION ONLY (OUTPATIENT)
Dept: FAMILY MEDICINE | Facility: CLINIC | Age: 22
End: 2022-06-01
Payer: COMMERCIAL

## 2022-06-01 NOTE — PROGRESS NOTES
[facility] ATHLET[facility]    Physical Therapy follow-up note  Treating therapist: Michele Ramos DPT    Date of service performed: June 1, 2022    Concern/injury: post op right post tib repair 4/1/2022    Subjective: Continues with no pain; walking with brace on. Has been working on lifting in weight room with brace     Objective:   Significant improvement in PF and DF; near full     Tightness present with EV/IV; greater with eversion; pulling up length of posterior tib     Continued tenderness of left SI joint with RDL movements     Assessment/plan: Continue with BFR. Continue active ROM stretching into eversion. Gait training without brace on. Follow up with Venessa next Thursday.      -Rehab Exercises performed:    Bike x5 min    Review of foot yoga ; toe extension    Isometric IV/Ev   Banded PFDF (re) x 20             BFR: igor knee extensions (8#); SLR (4#) fatigued out by 3rd set     -Rehab Exercises added: gentle stretch AROM inv/ever       Nick Burciaga PT

## 2022-06-09 ENCOUNTER — DOCUMENTATION ONLY (OUTPATIENT)
Dept: FAMILY MEDICINE | Facility: CLINIC | Age: 22
End: 2022-06-09
Payer: COMMERCIAL

## 2022-06-09 NOTE — PROGRESS NOTES
[facility] ATHLETICS    Physical Therapy follow-up note  Treating therapist: Venessa Tidwell DPT    Date of service performed: June 9, 2022    Concern/injury: post op right post tib repair 4/1/2022    Subjective: ankle is feeling good. Does get some swelling at the end of the day but no pain. Edema is gone by morning. Exercises are going well     Objective:   Significant improvement in PF and DF; near full     Tightness present with EV/IV; greater with eversion; pulling up length of posterior tib     Continued tenderness of left SI joint with RDL movements     Assessment/plan: Continue with BFR. Continue active ROM stretching into eversion. Gait training without brace on. Continue per post-operative protocol when back on campus.    -Rehab Exercises performed:    Bike x5 min    Resisted IV/EV   Review resisted PF + Toe Flex    Discussed next phase of protocol - okay to add BW LE exercises as tolerated             BFR: igor knee extensions (9#); SLR (2#) fatigued out by 3rd set     -Rehab Exercises added: resisted IV/EV, BW LE exercises        Leanna Tidwell, PT

## 2022-07-12 ENCOUNTER — DOCUMENTATION ONLY (OUTPATIENT)
Dept: FAMILY MEDICINE | Facility: CLINIC | Age: 22
End: 2022-07-12

## 2022-07-19 ENCOUNTER — DOCUMENTATION ONLY (OUTPATIENT)
Dept: FAMILY MEDICINE | Facility: CLINIC | Age: 22
End: 2022-07-19

## 2022-07-21 NOTE — PROGRESS NOTES
[facility] ATHLETICS    Physical Therapy follow-up note  Treating therapist: Venessa Tidwell DPT    Date of service performed: July 19, 2022    Concern/injury: post op right post tib repair 4/1/2022    Subjective: New exercises are going well, not causing any irritation. Was able to progress strength training without limitations.     Objective:   Equivalent non WB PF/DF/IV/EV  WB DF L 10 R 7.5    Assessment/plan: Progress through post operative protocol, continue with LE strengthening, no restrictions other than running and jumping. PT weekly.     -Rehab Exercises performed:    Bike x5 min    Resisted Post Tib    Standing Ankle IV/EV    Lunges - Alternating with Step    BOSU SL Balance    SL Squat + Cone Touch    Hip Hike with Ball on Wall    Lateral Step Down    Standing Clamshell       -Rehab Exercises added: progress SL balance, lateral step down, standing clamshells or hip hike     Leanna Tidwell, PT

## 2022-07-21 NOTE — PROGRESS NOTES
[facility] ATHLETICS    Physical Therapy follow-up note  Treating therapist: Venessa Tidwell DPT    Date of service performed: July 12, 2022    Concern/injury: post op right post tib repair 4/1/2022    Subjective: Ankle is feeling good. Was able to wean out of the tri-lock brace and only had mild edema when ambulating the past few weeks. Exercises are getting quite easy.     Objective:   Equivalent non WB PF/DF/IV/EV  WB DF L 10 R 7.5    Assessment/plan: Progress through post operative protocol, continue with LE strengthening, no restrictions other than running and jumping. PT weekly.     -Rehab Exercises performed:    Bike x5 min    WB DF Stretch + Medial Post    Lateral Step Down    BW Squat    SL Heel Raises    SL Balance + Foam +/- Running Man    Lunges           Discussed next phase of post-operative protocol     -Rehab Exercises added: SL balance on Foam, lunges, lateral step down         Leanna Tidwell, PT

## 2022-09-18 ENCOUNTER — HEALTH MAINTENANCE LETTER (OUTPATIENT)
Age: 22
End: 2022-09-18

## 2022-12-23 PROBLEM — M67.979 TIBIALIS POSTERIOR TENDINOPATHY: Status: RESOLVED | Noted: 2022-01-13 | Resolved: 2022-12-23

## 2023-01-28 ENCOUNTER — HEALTH MAINTENANCE LETTER (OUTPATIENT)
Age: 23
End: 2023-01-28

## 2024-02-25 ENCOUNTER — HEALTH MAINTENANCE LETTER (OUTPATIENT)
Age: 24
End: 2024-02-25

## 2025-03-15 ENCOUNTER — HEALTH MAINTENANCE LETTER (OUTPATIENT)
Age: 25
End: 2025-03-15

## (undated) RX ORDER — TRIAMCINOLONE ACETONIDE 40 MG/ML
INJECTION, SUSPENSION INTRA-ARTICULAR; INTRAMUSCULAR
Status: DISPENSED
Start: 2022-01-22

## (undated) RX ORDER — LIDOCAINE HYDROCHLORIDE 10 MG/ML
INJECTION, SOLUTION EPIDURAL; INFILTRATION; INTRACAUDAL; PERINEURAL
Status: DISPENSED
Start: 2021-10-28

## (undated) RX ORDER — LIDOCAINE HYDROCHLORIDE 10 MG/ML
INJECTION, SOLUTION EPIDURAL; INFILTRATION; INTRACAUDAL; PERINEURAL
Status: DISPENSED
Start: 2022-01-22